# Patient Record
Sex: FEMALE | Race: WHITE | NOT HISPANIC OR LATINO | Employment: OTHER | URBAN - NONMETROPOLITAN AREA
[De-identification: names, ages, dates, MRNs, and addresses within clinical notes are randomized per-mention and may not be internally consistent; named-entity substitution may affect disease eponyms.]

---

## 2017-01-03 ENCOUNTER — OFFICE VISIT (OUTPATIENT)
Dept: FAMILY MEDICINE CLINIC | Facility: CLINIC | Age: 57
End: 2017-01-03

## 2017-01-03 VITALS
HEART RATE: 89 BPM | BODY MASS INDEX: 31.56 KG/M2 | HEIGHT: 62 IN | TEMPERATURE: 99.2 F | DIASTOLIC BLOOD PRESSURE: 71 MMHG | SYSTOLIC BLOOD PRESSURE: 97 MMHG | WEIGHT: 171.5 LBS | OXYGEN SATURATION: 96 %

## 2017-01-03 DIAGNOSIS — H92.01 OTALGIA OF RIGHT EAR: ICD-10-CM

## 2017-01-03 DIAGNOSIS — E78.5 DYSLIPIDEMIA: ICD-10-CM

## 2017-01-03 DIAGNOSIS — M51.36 DEGENERATION OF INTERVERTEBRAL DISC OF LUMBAR REGION: ICD-10-CM

## 2017-01-03 DIAGNOSIS — K21.9 GASTROESOPHAGEAL REFLUX DISEASE WITHOUT ESOPHAGITIS: ICD-10-CM

## 2017-01-03 DIAGNOSIS — J31.0 SEVERE CHRONIC RHINITIS: ICD-10-CM

## 2017-01-03 DIAGNOSIS — J34.2 DEVIATED NASAL SEPTUM: ICD-10-CM

## 2017-01-03 DIAGNOSIS — I10 ESSENTIAL HYPERTENSION: Primary | ICD-10-CM

## 2017-01-03 DIAGNOSIS — J41.0 SIMPLE CHRONIC BRONCHITIS (HCC): ICD-10-CM

## 2017-01-03 PROCEDURE — 99214 OFFICE O/P EST MOD 30 MIN: CPT | Performed by: FAMILY MEDICINE

## 2017-01-03 RX ORDER — OXYCODONE AND ACETAMINOPHEN 10; 325 MG/1; MG/1
1 TABLET ORAL EVERY 8 HOURS PRN
Qty: 90 TABLET | Refills: 0 | Status: SHIPPED | OUTPATIENT
Start: 2017-01-03 | End: 2017-01-31 | Stop reason: SDUPTHER

## 2017-01-03 NOTE — PROGRESS NOTES
Subjective   Zina Hameed is a 56 y.o. female.     Chief Complaint   Patient presents with   • Heartburn   • Med Refill       History of Present Illness   Patient is here for scheduled follow-up visit.  She does continue to have heartburn when she misses dosing of her medication.  Patient also complains of continued right ear discomfort.  Denies any drainage.  No trauma.  She has chronic nasal congestion which has worsened over the last several months as well.  She is continue to use daily saline spray as well as a daily nasal steroid spray.  She denies any fever or chills.  She does have frontal pressure at times worsened with sleeping.  She has a history of nasal fracture several years ago.  She states that her symptoms are worsened since that time.  The following portions of the patient's history were reviewed and updated as appropriate: allergies, current medications, past family history, past medical history, past social history, past surgical history and problem list.    Review of Systems   Constitutional: Negative for activity change, appetite change, chills, diaphoresis, fatigue, fever and unexpected weight change.   HENT: Positive for congestion, ear pain, postnasal drip, rhinorrhea and sinus pressure. Negative for dental problem, drooling, ear discharge, facial swelling, hearing loss, mouth sores, nosebleeds, sneezing, sore throat, tinnitus, trouble swallowing and voice change.    Eyes: Negative for photophobia, pain, discharge, redness, itching and visual disturbance.   Respiratory: Positive for cough. Negative for apnea, choking, chest tightness, shortness of breath, wheezing and stridor.    Cardiovascular: Negative for chest pain, palpitations and leg swelling.   Gastrointestinal: Negative for abdominal distention, abdominal pain, anal bleeding, blood in stool, constipation, diarrhea, nausea, rectal pain and vomiting.   Endocrine: Negative for cold intolerance, heat intolerance, polydipsia,  polyphagia and polyuria.   Genitourinary: Negative for decreased urine volume, difficulty urinating, dysuria, enuresis, flank pain, frequency, genital sores, hematuria and urgency.   Musculoskeletal: Positive for arthralgias, back pain, joint swelling and myalgias. Negative for gait problem, neck pain and neck stiffness.   Skin: Negative for color change, pallor, rash and wound.   Allergic/Immunologic: Negative for food allergies and immunocompromised state.   Neurological: Negative for dizziness, tremors, seizures, syncope, facial asymmetry, speech difficulty, weakness, light-headedness, numbness and headaches.   Hematological: Negative for adenopathy. Does not bruise/bleed easily.   Psychiatric/Behavioral: Negative for agitation, behavioral problems, confusion, decreased concentration, dysphoric mood, hallucinations, self-injury, sleep disturbance and suicidal ideas. The patient is not nervous/anxious and is not hyperactive.        Patient Active Problem List   Diagnosis   • Peripheral vascular disease   • Plantar fasciitis   • Degeneration of intervertebral disc of lumbar region   • Hypertension   • Dyslipidemia   • Chronic obstructive pulmonary disease   • Atopic rhinitis   • Gastroesophageal reflux disease   • Chronic constipation   • Carpal tunnel syndrome   • Nonspecific abnormal results of thyroid function study   • Screening mammogram, encounter for       Current Outpatient Prescriptions on File Prior to Visit   Medication Sig Dispense Refill   • albuterol (PROVENTIL HFA;VENTOLIN HFA) 108 (90 BASE) MCG/ACT inhaler Inhale 2 puffs every 4 (four) hours as needed for wheezing or shortness of air. 1 inhaler 5   • amitriptyline (ELAVIL) 25 MG tablet Take 1 tablet by mouth every night. 30 tablet 5   • aspirin 81 MG EC tablet Take 1 tablet by mouth 4 (four) times a day. 120 tablet 5   • fluticasone (FLONASE) 50 MCG/ACT nasal spray 1 spray into each nostril 2 (two) times a day. 1 each 5   • gabapentin (NEURONTIN)  100 MG capsule Take 1 capsule by mouth 2 (two) times a day. 60 capsule 3   • ipratropium-albuterol (DUO-NEB) 0.5-2.5 mg/mL nebulizer Take 1.5 mL by nebulization every 6 (six) hours as needed for wheezing or shortness of air. 360 mL 5   • mometasone-formoterol (DULERA 200) 200-5 MCG/ACT inhaler Inhale 2 puffs 2 (two) times a day. 13 g 5   • Omega-3 Fatty Acids (FISH OIL) 1000 MG capsule capsule Take 1 capsule by mouth 2 (two) times a day.     • ondansetron (ZOFRAN) 4 MG tablet Take 1 tablet by mouth Every 8 (Eight) Hours As Needed for nausea or vomiting. 20 tablet 1   • pantoprazole (PROTONIX) 40 MG EC tablet Take 1 tablet by mouth daily. 30 tablet 3   • prasugrel (EFFIENT) 10 MG tablet Take 1 tablet by mouth Daily. 30 tablet 5   • propranolol (INDERAL) 20 MG tablet Take 1 tablet by mouth 2 (two) times a day. 60 tablet 5   • ranitidine (ZANTAC) 150 MG tablet Take 1 tablet by mouth 2 (two) times a day. 60 tablet 5   • simvastatin (ZOCOR) 20 MG tablet Take 1 tablet by mouth every night. 30 tablet 5   • spironolactone (ALDACTONE) 50 MG tablet Take 1 tablet by mouth 2 (two) times a day. 60 tablet 5   • tiotropium (SPIRIVA) 18 MCG per inhalation capsule Place 2 puffs into inhaler and inhale daily. 30 capsule 5   • [DISCONTINUED] oxyCODONE-acetaminophen (PERCOCET)  MG per tablet Take 1 tablet by mouth Every 8 (Eight) Hours As Needed for moderate pain (4-6). 90 tablet 0     No current facility-administered medications on file prior to visit.        Social History     Social History   • Marital status:      Spouse name: N/A   • Number of children: N/A   • Years of education: N/A     Occupational History   • Not on file.     Social History Main Topics   • Smoking status: Former Smoker   • Smokeless tobacco: Not on file   • Alcohol use No      Comment: unknown   • Drug use: No   • Sexual activity: Defer     Other Topics Concern   • Not on file     Social History Narrative       Objective   Blood pressure 97/71,  "pulse 89, temperature 99.2 °F (37.3 °C), temperature source Oral, height 62\" (157.5 cm), weight 171 lb 8 oz (77.8 kg), SpO2 96 %.     Physical Exam Constitutional   General appearance: No acute distress, well appearing and well nourished.    Head and Face   Head and face: Normal.    Palpation of the face and sinuses: No sinus tenderness.    Eyes   Conjunctiva and lids: No swelling, erythema or discharge.    Ears, Nose, Mouth, and Throat   External inspection of ears and nose: Normal.    Otoscopic examination: bulging TM esther, dec mobility to valsalva and insufflation esther, worse to R; Hearing: Normal.    Nasal mucosa, septum, and turbinates: Deviated septum with boggy and inflamed nasal turbinates bilaterally.    Lips, teeth, and gums: Normal, good dentition.    Oropharynx: Normal with no erythema, edema, exudate or lesions.    Neck   Neck: Supple, symmetric, trachea midline, no masses.    Pulmonary   Respiratory effort: No increased work of breathing or signs of respiratory distress.    Auscultation of lungs: Clear to auscultation.    Cardiovascular   Auscultation of heart: Normal rate and rhythm, normal S1 and S2, no murmurs.    Pedal pulses: 2+ bilaterally.    Chest Pt deferred.    Chest: Normal.    Abdomen   Abdomen: Non-tender, no masses.    Liver and spleen: No hepatomegaly or splenomegaly.    Genitourinary Pt deferred.    Lymphatic   Palpation of lymph nodes in neck: No lymphadenopathy.    Musculoskeletal   Gait and station: Normal.    Digits and nails: Normal without clubbing or cyanosis.    Joints, bones, and muscles: Normal.    Range of motion: Normal.    Stability: Normal.    Muscle strength/tone: Normal.    Skin   Skin and subcutaneous tissue: Normal without rashes or lesions.    Neurologic   Cranial nerves: Cranial nerves II-XII intact.    Cortical function: Normal mental status.    Reflexes: 2+ and symmetric.    Sensation: No sensory loss.    Coordination: Normal finger to nose and heel to shin. "    Psychiatric   Judgment and insight: Normal.    Orientation to person, place, and time: Normal.    Recent and remote memory: Intact.   Mood and affect: Normal    Results for orders placed or performed in visit on 08/12/16   Comprehensive metabolic panel   Result Value Ref Range    Glucose 81 70 - 100 mg/dL    BUN 11 9 - 23 mg/dL    Creatinine 1.00 0.60 - 1.30 mg/dL    Sodium 141 132 - 146 mmol/L    Potassium 4.4 3.5 - 5.5 mmol/L    Chloride 107 99 - 109 mmol/L    CO2 27.0 20.0 - 31.0 mmol/L    Calcium 9.8 8.7 - 10.4 mg/dL    Total Protein 7.8 5.7 - 8.2 g/dL    Albumin 4.70 3.20 - 4.80 g/dL    ALT (SGPT) 61 (H) 7 - 40 U/L    AST (SGOT) 46 (H) 0 - 33 U/L    Alkaline Phosphatase 68 25 - 100 U/L    Total Bilirubin 0.4 0.3 - 1.2 mg/dL    eGFR Non African Amer 58 (L) >60 mL/min/1.73    Globulin 3.1 gm/dL    A/G Ratio 1.5 g/dL    BUN/Creatinine Ratio 11.0 7.0 - 25.0    Anion Gap 7.0 3.0 - 11.0 mmol/L   NMR LipoProfile   Result Value Ref Range    LDL-P 1161 (H) <1000 nmol/L    LDL Cholesterol  69 0 - 99 mg/dL    HDL-C 39 (L) >39 mg/dL    Triglycerides 237 (H) 0 - 149 mg/dL    Total Cholesterol 155 100 - 199 mg/dL    HDL-P (Total) 32.5 >=30.5 umol/L    Small LDL-P 581 (H) <=527 nmol/L    LDL Size 20.3 >20.5 nm   TSH   Result Value Ref Range    TSH 3.760 0.350 - 5.350 mIU/mL   T4, free   Result Value Ref Range    Free T4 0.86 (L) 0.89 - 1.76 ng/dL   CBC auto differential   Result Value Ref Range    WBC 6.04 3.50 - 10.80 10*3/mm3    RBC 4.19 3.89 - 5.14 10*6/mm3    Hemoglobin 13.8 11.5 - 15.5 g/dL    Hematocrit 42.2 34.5 - 44.0 %    .7 (H) 80.0 - 99.0 fL    MCH 32.9 (H) 27.0 - 31.0 pg    MCHC 32.7 32.0 - 36.0 g/dL    RDW 12.6 11.3 - 14.5 %    RDW-SD 46.5 37.0 - 54.0 fl    MPV 11.8 6.0 - 12.0 fL    Platelets 149 (L) 150 - 450 10*3/mm3    Neutrophil % 56.7 41.0 - 71.0 %    Lymphocyte % 34.3 24.0 - 44.0 %    Monocyte % 7.6 0.0 - 12.0 %    Eosinophil % 1.0 0.0 - 3.0 %    Basophil % 0.2 0.0 - 1.0 %    Immature Grans % 0.2  0.0 - 0.6 %    Neutrophils, Absolute 3.43 1.50 - 8.30 10*3/mm3    Lymphocytes, Absolute 2.07 0.60 - 4.80 10*3/mm3    Monocytes, Absolute 0.46 0.00 - 1.00 10*3/mm3    Eosinophils, Absolute 0.06 (L) 0.10 - 0.30 10*3/mm3    Basophils, Absolute 0.01 0.00 - 0.20 10*3/mm3    Immature Grans, Absolute 0.01 0.00 - 0.03 10*3/mm3       Assessment/Plan   Problems Addressed this Visit        Cardiovascular and Mediastinum    Hypertension - Primary       Respiratory    Chronic obstructive pulmonary disease       Digestive    Gastroesophageal reflux disease       Musculoskeletal and Integument    Degeneration of intervertebral disc of lumbar region    Relevant Medications    oxyCODONE-acetaminophen (PERCOCET)  MG per tablet       Other    Dyslipidemia      Other Visit Diagnoses     BMI 31.0-31.9,adult        Otalgia of right ear        Relevant Orders    Ambulatory Referral to ENT (Otolaryngology)    Deviated nasal septum        Relevant Orders    Ambulatory Referral to ENT (Otolaryngology)    Severe chronic rhinitis        Relevant Orders    Ambulatory Referral to ENT (Otolaryngology)               Discussion/Summary:  Discussed plan of care in detail with pt today; pt verb understanding and agrees; counseled for approx 15 min of total 25 min exam time.    Discussed need for reduction in sodium/salt/caffeine intake; improve sleep habits as able; inc formal CV exercise program with goal of vigorous activity most, if not all, days of the week; goal of 50 min of sustained HR CV exercise.    Continue dietary/lifestyle mods for GERD; cont PPI.    Refer to ENT for eval of additional tx options for chronic right serous OM and otalgia, with severe chronic rhinitis; suspect traumatic deviated septum is contributing, but may benefit from formal allergy testing also.    There are no Patient Instructions on file for this visit.

## 2017-01-03 NOTE — MR AVS SNAPSHOT
Zina Hameed   1/3/2017 10:30 AM   Office Visit    Dept Phone:  [phone]   Encounter #:  [id number]    Provider:  Young Fraire DO   Department:  [facility] PRIMARY CARE                Your Full Care Plan              Where to Get Your Medications      You can get these medications from any pharmacy     Bring a paper prescription for each of these medications     oxyCODONE-acetaminophen  MG per tablet            Your Updated Medication List          This list is accurate as of: 1/3/17 11:06 AM.  Always use your most recent med list.                albuterol 108 (90 BASE) MCG/ACT inhaler   Commonly known as:  PROVENTIL HFA;VENTOLIN HFA   Inhale 2 puffs every 4 (four) hours as needed for wheezing or shortness of air.       amitriptyline 25 MG tablet   Commonly known as:  ELAVIL   Take 1 tablet by mouth every night.       aspirin 81 MG EC tablet   Take 1 tablet by mouth 4 (four) times a day.       fish oil 1000 MG capsule capsule   Take 1 capsule by mouth 2 (two) times a day.       fluticasone 50 MCG/ACT nasal spray   Commonly known as:  FLONASE   1 spray into each nostril 2 (two) times a day.       gabapentin 100 MG capsule   Commonly known as:  NEURONTIN   Take 1 capsule by mouth 2 (two) times a day.       ipratropium-albuterol 0.5-2.5 mg/mL nebulizer   Commonly known as:  DUO-NEB   Take 1.5 mL by nebulization every 6 (six) hours as needed for wheezing or shortness of air.       mometasone-formoterol 200-5 MCG/ACT inhaler   Commonly known as:  DULERA 200   Inhale 2 puffs 2 (two) times a day.       ondansetron 4 MG tablet   Commonly known as:  ZOFRAN   Take 1 tablet by mouth Every 8 (Eight) Hours As Needed for nausea or vomiting.       oxyCODONE-acetaminophen  MG per tablet   Commonly known as:  PERCOCET   Take 1 tablet by mouth Every 8 (Eight) Hours As Needed for moderate pain (4-6).       pantoprazole 40 MG EC tablet   Commonly known as:  PROTONIX      Take 1 tablet by mouth daily.       prasugrel 10 MG tablet   Commonly known as:  EFFIENT   Take 1 tablet by mouth Daily.       propranolol 20 MG tablet   Commonly known as:  INDERAL   Take 1 tablet by mouth 2 (two) times a day.       raNITIdine 150 MG tablet   Commonly known as:  ZANTAC   Take 1 tablet by mouth 2 (two) times a day.       simvastatin 20 MG tablet   Commonly known as:  ZOCOR   Take 1 tablet by mouth every night.       spironolactone 50 MG tablet   Commonly known as:  ALDACTONE   Take 1 tablet by mouth 2 (two) times a day.       tiotropium 18 MCG per inhalation capsule   Commonly known as:  SPIRIVA   Place 2 puffs into inhaler and inhale daily.               We Performed the Following     Ambulatory Referral to ENT (Otolaryngology)       You Were Diagnosed With        Codes Comments    Essential hypertension    -  Primary ICD-10-CM: I10  ICD-9-CM: 401.9     Degeneration of intervertebral disc of lumbar region     ICD-10-CM: M51.36  ICD-9-CM: 722.52     Gastroesophageal reflux disease without esophagitis     ICD-10-CM: K21.9  ICD-9-CM: 530.81     Simple chronic bronchitis     ICD-10-CM: J41.0  ICD-9-CM: 491.0     Dyslipidemia     ICD-10-CM: E78.5  ICD-9-CM: 272.4     BMI 31.0-31.9,adult     ICD-10-CM: Z68.31  ICD-9-CM: V85.31     Otalgia of right ear     ICD-10-CM: H92.01  ICD-9-CM: 388.70     Deviated nasal septum     ICD-10-CM: J34.2  ICD-9-CM: 470     Severe chronic rhinitis     ICD-10-CM: J31.0  ICD-9-CM: 472.0       Instructions     None    Patient Instructions History      Upcoming Appointments     Visit Type Date Time Department    OFFICE VISIT 1/3/2017 10:30 AM [facility]    OFFICE VISIT 1/31/2017 11:15 AM [facility]      [facility] Signup     [facility] [employer] allows you to send messages to your doctor, view your test results, renew your prescriptions, schedule appointments, and more. To sign up, go to [employer] and click on the Sign Up Now link in the New  "User? box. Enter your [employer] Activation Code exactly as it appears below along with the last four digits of your Social Security Number and your Date of Birth () to complete the sign-up process. If you do not sign up before the expiration date, you must request a new code.    [employer] Activation Code: [email or web address]  Expires: 2017  5:36 AM    If you have questions, you can email [email or web address] or call [phone] to talk to our [employer] staff. Remember, [employer] is NOT to be used for urgent needs. For medical emergencies, dial 911.               Other Info from Your Visit           Your Appointments     2017 11:15 AM EST   Office Visit with Young Fraire DO   [facility] PRIMARY CARE (--)    [address] [place] [id number] [phone]           Arrive 15 minutes prior to appointment.              Allergies     No Known Allergies      Reason for Visit     Heartburn     Med Refill           Vital Signs     Blood Pressure Pulse Temperature Height Weight Oxygen Saturation    97/71 89 99.2 °F (37.3 °C) (Oral) 62\" (157.5 cm) 171 lb 8 oz (77.8 kg) 96%    Body Mass Index Smoking Status                31.37 kg/m2 Former Smoker          Problems and Diagnoses Noted     Chronic airway obstruction    Degeneration of lumbar or lumbosacral intervertebral disc    Dyslipidemia    Acid reflux disease    High blood pressure    Body mass index 31.0-31.9, adult        Ear pain        Deviated septum        [place]            "

## 2017-01-07 DIAGNOSIS — M51.36 DEGENERATION OF INTERVERTEBRAL DISC OF LUMBAR REGION: ICD-10-CM

## 2017-01-09 RX ORDER — GABAPENTIN 100 MG/1
CAPSULE ORAL
Qty: 60 CAPSULE | Refills: 3 | Status: SHIPPED | OUTPATIENT
Start: 2017-01-09 | End: 2017-04-28 | Stop reason: SDUPTHER

## 2017-01-31 ENCOUNTER — OFFICE VISIT (OUTPATIENT)
Dept: FAMILY MEDICINE CLINIC | Facility: CLINIC | Age: 57
End: 2017-01-31

## 2017-01-31 VITALS
HEIGHT: 62 IN | HEART RATE: 100 BPM | DIASTOLIC BLOOD PRESSURE: 83 MMHG | SYSTOLIC BLOOD PRESSURE: 146 MMHG | BODY MASS INDEX: 30.36 KG/M2 | WEIGHT: 165 LBS | OXYGEN SATURATION: 100 % | TEMPERATURE: 98.7 F

## 2017-01-31 DIAGNOSIS — M51.36 DEGENERATION OF INTERVERTEBRAL DISC OF LUMBAR REGION: ICD-10-CM

## 2017-01-31 DIAGNOSIS — K21.9 GASTROESOPHAGEAL REFLUX DISEASE WITHOUT ESOPHAGITIS: ICD-10-CM

## 2017-01-31 DIAGNOSIS — J41.0 SIMPLE CHRONIC BRONCHITIS (HCC): ICD-10-CM

## 2017-01-31 DIAGNOSIS — I10 ESSENTIAL HYPERTENSION: Primary | ICD-10-CM

## 2017-01-31 PROCEDURE — 99214 OFFICE O/P EST MOD 30 MIN: CPT | Performed by: FAMILY MEDICINE

## 2017-01-31 RX ORDER — OXYCODONE AND ACETAMINOPHEN 10; 325 MG/1; MG/1
1 TABLET ORAL EVERY 8 HOURS PRN
Qty: 90 TABLET | Refills: 0 | Status: SHIPPED | OUTPATIENT
Start: 2017-01-31 | End: 2017-02-28 | Stop reason: SDUPTHER

## 2017-01-31 NOTE — MR AVS SNAPSHOT
Zina Lewisbarbflorinda   1/31/2017 11:15 AM   Office Visit    Dept Phone:  [phone]   Encounter #:  [id number]    Provider:  Young Fraire DO   Department:  [facility] PRIMARY CARE                Your Full Care Plan              Where to Get Your Medications      You can get these medications from any pharmacy     Bring a paper prescription for each of these medications     oxyCODONE-acetaminophen  MG per tablet            Your Updated Medication List          This list is accurate as of: 1/31/17 11:49 AM.  Always use your most recent med list.                albuterol 108 (90 BASE) MCG/ACT inhaler   Commonly known as:  PROVENTIL HFA;VENTOLIN HFA   Inhale 2 puffs every 4 (four) hours as needed for wheezing or shortness of air.       amitriptyline 25 MG tablet   Commonly known as:  ELAVIL   Take 1 tablet by mouth every night.       aspirin 81 MG EC tablet   Take 1 tablet by mouth 4 (four) times a day.       fish oil 1000 MG capsule capsule   Take 1 capsule by mouth 2 (two) times a day.       fluticasone 50 MCG/ACT nasal spray   Commonly known as:  FLONASE   1 spray into each nostril 2 (two) times a day.       gabapentin 100 MG capsule   Commonly known as:  NEURONTIN   take 1 capsule by mouth twice a day       ipratropium-albuterol 0.5-2.5 mg/mL nebulizer   Commonly known as:  DUO-NEB   Take 1.5 mL by nebulization every 6 (six) hours as needed for wheezing or shortness of air.       mometasone-formoterol 200-5 MCG/ACT inhaler   Commonly known as:  DULERA 200   Inhale 2 puffs 2 (two) times a day.       ondansetron 4 MG tablet   Commonly known as:  ZOFRAN   Take 1 tablet by mouth Every 8 (Eight) Hours As Needed for nausea or vomiting.       oxyCODONE-acetaminophen  MG per tablet   Commonly known as:  PERCOCET   Take 1 tablet by mouth Every 8 (Eight) Hours As Needed for moderate pain (4-6).       pantoprazole 40 MG EC tablet   Commonly known as:  PROTONIX   Take 1  tablet by mouth daily.       prasugrel 10 MG tablet   Commonly known as:  EFFIENT   Take 1 tablet by mouth Daily.       propranolol 20 MG tablet   Commonly known as:  INDERAL   Take 1 tablet by mouth 2 (two) times a day.       raNITIdine 150 MG tablet   Commonly known as:  ZANTAC   Take 1 tablet by mouth 2 (two) times a day.       simvastatin 20 MG tablet   Commonly known as:  ZOCOR   Take 1 tablet by mouth every night.       spironolactone 50 MG tablet   Commonly known as:  ALDACTONE   Take 1 tablet by mouth 2 (two) times a day.       tiotropium 18 MCG per inhalation capsule   Commonly known as:  SPIRIVA   Place 2 puffs into inhaler and inhale daily.               You Were Diagnosed With        Codes Comments    Essential hypertension    -  Primary ICD-10-CM: I10  ICD-9-CM: 401.9     Degeneration of intervertebral disc of lumbar region     ICD-10-CM: M51.36  ICD-9-CM: 722.52     BMI 30.0-30.9,adult     ICD-10-CM: Z68.30  ICD-9-CM: V85.30     Simple chronic bronchitis     ICD-10-CM: J41.0  ICD-9-CM: 491.0     Gastroesophageal reflux disease without esophagitis     ICD-10-CM: K21.9  ICD-9-CM: 530.81       Instructions     None    Patient Instructions History      Upcoming Appointments     Visit Type Date Time Department    OFFICE VISIT 2017 11:15 AM [facility]    OFFICE VISIT 2017 11:15 AM [facility]      [employer] Signup     [facility] [employer] allows you to send messages to your doctor, view your test results, renew your prescriptions, schedule appointments, and more. To sign up, go to [employer] and click on the Sign Up Now link in the New User? box. Enter your [employer] Activation Code exactly as it appears below along with the last four digits of your Social Security Number and your Date of Birth () to complete the sign-up process. If you do not sign up before the expiration date, you must request a new code.    [employer] Activation Code: [email or web address]  Expires:  "2/4/2017  5:36 AM    If you have questions, you can email [email or web address] or call [phone] to talk to our MyChart staff. Remember, [employer] is NOT to be used for urgent needs. For medical emergencies, dial 911.               Other Info from Your Visit           Your Appointments     Feb 28, 2017 11:15 AM EST   Office Visit with Young Fraire DO   [facility] PRIMARY CARE (--)    [address] [place] [id number] [phone]           Arrive 15 minutes prior to appointment.              Allergies     No Known Allergies      Reason for Visit     Follow-up med refills      Vital Signs     Blood Pressure Pulse Temperature Height Weight Oxygen Saturation    146/83 (BP Location: Right arm, Patient Position: Sitting) 100 98.7 °F (37.1 °C) 62\" (157.5 cm) 165 lb (74.8 kg) 100%    Body Mass Index Smoking Status                30.18 kg/m2 Former Smoker          Problems and Diagnoses Noted     Chronic airway obstruction    Degeneration of lumbar or lumbosacral intervertebral disc    Acid reflux disease    High blood pressure    Body mass index 30.0-30.9, adult            "

## 2017-01-31 NOTE — PROGRESS NOTES
Subjective   Zina Hameed is a 56 y.o. female.     Chief Complaint   Patient presents with   • Follow-up     med refills       History of Present Illness   Pt here for sched f/u appt; has made sig changes to diet/exercise program   The following portions of the patient's history were reviewed and updated as appropriate: allergies, current medications, past family history, past medical history, past social history, past surgical history and problem list.    Review of Systems   Constitutional: Negative for activity change, appetite change, chills, diaphoresis, fatigue, fever and unexpected weight change.   HENT: Positive for congestion and postnasal drip. Negative for dental problem, drooling, ear discharge, ear pain, facial swelling, hearing loss, mouth sores, nosebleeds, rhinorrhea, sinus pressure, sneezing, sore throat, tinnitus, trouble swallowing and voice change.    Eyes: Negative for photophobia, pain, discharge, redness, itching and visual disturbance.   Respiratory: Positive for cough. Negative for apnea, choking, chest tightness, shortness of breath, wheezing and stridor.    Cardiovascular: Negative for chest pain, palpitations and leg swelling.   Gastrointestinal: Negative for abdominal distention, abdominal pain, anal bleeding, blood in stool, constipation, diarrhea, nausea, rectal pain and vomiting.   Endocrine: Negative for cold intolerance, heat intolerance, polydipsia, polyphagia and polyuria.   Genitourinary: Negative for decreased urine volume, difficulty urinating, dysuria, enuresis, flank pain, frequency, genital sores, hematuria and urgency.   Musculoskeletal: Positive for arthralgias, back pain, joint swelling and myalgias. Negative for gait problem, neck pain and neck stiffness.   Skin: Negative for color change, pallor, rash and wound.   Allergic/Immunologic: Negative for food allergies and immunocompromised state.   Neurological: Negative for dizziness, tremors, seizures, syncope, facial  asymmetry, speech difficulty, weakness, light-headedness, numbness and headaches.   Hematological: Negative for adenopathy. Does not bruise/bleed easily.   Psychiatric/Behavioral: Negative for agitation, behavioral problems, confusion, decreased concentration, dysphoric mood, hallucinations, self-injury, sleep disturbance and suicidal ideas. The patient is not nervous/anxious and is not hyperactive.        Patient Active Problem List   Diagnosis   • Peripheral vascular disease   • Plantar fasciitis   • Degeneration of intervertebral disc of lumbar region   • Hypertension   • Dyslipidemia   • Chronic obstructive pulmonary disease   • Atopic rhinitis   • Gastroesophageal reflux disease   • Chronic constipation   • Carpal tunnel syndrome   • Nonspecific abnormal results of thyroid function study   • Screening mammogram, encounter for       Current Outpatient Prescriptions on File Prior to Visit   Medication Sig Dispense Refill   • albuterol (PROVENTIL HFA;VENTOLIN HFA) 108 (90 BASE) MCG/ACT inhaler Inhale 2 puffs every 4 (four) hours as needed for wheezing or shortness of air. 1 inhaler 5   • amitriptyline (ELAVIL) 25 MG tablet Take 1 tablet by mouth every night. 30 tablet 5   • aspirin 81 MG EC tablet Take 1 tablet by mouth 4 (four) times a day. 120 tablet 5   • fluticasone (FLONASE) 50 MCG/ACT nasal spray 1 spray into each nostril 2 (two) times a day. 1 each 5   • gabapentin (NEURONTIN) 100 MG capsule take 1 capsule by mouth twice a day 60 capsule 3   • ipratropium-albuterol (DUO-NEB) 0.5-2.5 mg/mL nebulizer Take 1.5 mL by nebulization every 6 (six) hours as needed for wheezing or shortness of air. 360 mL 5   • mometasone-formoterol (DULERA 200) 200-5 MCG/ACT inhaler Inhale 2 puffs 2 (two) times a day. 13 g 5   • Omega-3 Fatty Acids (FISH OIL) 1000 MG capsule capsule Take 1 capsule by mouth 2 (two) times a day.     • ondansetron (ZOFRAN) 4 MG tablet Take 1 tablet by mouth Every 8 (Eight) Hours As Needed for nausea or  "vomiting. 20 tablet 1   • oxyCODONE-acetaminophen (PERCOCET)  MG per tablet Take 1 tablet by mouth Every 8 (Eight) Hours As Needed for moderate pain (4-6). 90 tablet 0   • pantoprazole (PROTONIX) 40 MG EC tablet Take 1 tablet by mouth daily. 30 tablet 3   • prasugrel (EFFIENT) 10 MG tablet Take 1 tablet by mouth Daily. 30 tablet 5   • propranolol (INDERAL) 20 MG tablet Take 1 tablet by mouth 2 (two) times a day. 60 tablet 5   • ranitidine (ZANTAC) 150 MG tablet Take 1 tablet by mouth 2 (two) times a day. 60 tablet 5   • simvastatin (ZOCOR) 20 MG tablet Take 1 tablet by mouth every night. 30 tablet 5   • spironolactone (ALDACTONE) 50 MG tablet Take 1 tablet by mouth 2 (two) times a day. 60 tablet 5   • tiotropium (SPIRIVA) 18 MCG per inhalation capsule Place 2 puffs into inhaler and inhale daily. 30 capsule 5     No current facility-administered medications on file prior to visit.        Social History     Social History   • Marital status:      Spouse name: N/A   • Number of children: N/A   • Years of education: N/A     Occupational History   • Not on file.     Social History Main Topics   • Smoking status: Former Smoker   • Smokeless tobacco: Not on file   • Alcohol use No      Comment: unknown   • Drug use: No   • Sexual activity: Defer     Other Topics Concern   • Not on file     Social History Narrative       Objective   Blood pressure 146/83, pulse 100, temperature 98.7 °F (37.1 °C), height 62\" (157.5 cm), weight 165 lb (74.8 kg), SpO2 100 %.     Physical Exam Constitutional   General appearance: No acute distress, well appearing and well nourished.    Head and Face   Head and face: Normal.    Palpation of the face and sinuses: No sinus tenderness.    Eyes   Conjunctiva and lids: No swelling, erythema or discharge.    Ears, Nose, Mouth, and Throat   External inspection of ears and nose: Normal.    Otoscopic examination: bulging TM esther, dec mobility to valsalva and insufflation esther, worse to R; " Hearing: Normal.    Nasal mucosa, septum, and turbinates: Deviated septum with boggy and inflamed nasal turbinates bilaterally.    Lips, teeth, and gums: Normal, good dentition.    Oropharynx: Normal with no erythema, edema, exudate or lesions.    Neck   Neck: Supple, symmetric, trachea midline, no masses.    Pulmonary   Respiratory effort: No increased work of breathing or signs of respiratory distress.    Auscultation of lungs: Clear to auscultation.    Cardiovascular   Auscultation of heart: Normal rate and rhythm, normal S1 and S2, no murmurs.    Pedal pulses: 2+ bilaterally.    Chest Pt deferred.    Chest: Normal.    Abdomen   Abdomen: Non-tender, no masses.    Liver and spleen: No hepatomegaly or splenomegaly.    Genitourinary Pt deferred.    Lymphatic   Palpation of lymph nodes in neck: No lymphadenopathy.    Musculoskeletal   Gait and station: Normal.    Digits and nails: Normal without clubbing or cyanosis.    Joints, bones, and muscles: Normal.    Range of motion: Normal.    Stability: Normal.    Muscle strength/tone: Normal.    Skin   Skin and subcutaneous tissue: Normal without rashes or lesions.    Neurologic   Cranial nerves: Cranial nerves II-XII intact.    Cortical function: Normal mental status.    Reflexes: 2+ and symmetric.    Sensation: No sensory loss.    Coordination: Normal finger to nose and heel to shin.    Psychiatric   Judgment and insight: Normal.    Orientation to person, place, and time: Normal.    Recent and remote memory: Intact.   Mood and affect: Normal    Results for orders placed or performed in visit on 08/12/16   Comprehensive metabolic panel   Result Value Ref Range    Glucose 81 70 - 100 mg/dL    BUN 11 9 - 23 mg/dL    Creatinine 1.00 0.60 - 1.30 mg/dL    Sodium 141 132 - 146 mmol/L    Potassium 4.4 3.5 - 5.5 mmol/L    Chloride 107 99 - 109 mmol/L    CO2 27.0 20.0 - 31.0 mmol/L    Calcium 9.8 8.7 - 10.4 mg/dL    Total Protein 7.8 5.7 - 8.2 g/dL    Albumin 4.70 3.20 - 4.80 g/dL     ALT (SGPT) 61 (H) 7 - 40 U/L    AST (SGOT) 46 (H) 0 - 33 U/L    Alkaline Phosphatase 68 25 - 100 U/L    Total Bilirubin 0.4 0.3 - 1.2 mg/dL    eGFR Non African Amer 58 (L) >60 mL/min/1.73    Globulin 3.1 gm/dL    A/G Ratio 1.5 g/dL    BUN/Creatinine Ratio 11.0 7.0 - 25.0    Anion Gap 7.0 3.0 - 11.0 mmol/L   NMR LipoProfile   Result Value Ref Range    LDL-P 1161 (H) <1000 nmol/L    LDL Cholesterol  69 0 - 99 mg/dL    HDL-C 39 (L) >39 mg/dL    Triglycerides 237 (H) 0 - 149 mg/dL    Total Cholesterol 155 100 - 199 mg/dL    HDL-P (Total) 32.5 >=30.5 umol/L    Small LDL-P 581 (H) <=527 nmol/L    LDL Size 20.3 >20.5 nm   TSH   Result Value Ref Range    TSH 3.760 0.350 - 5.350 mIU/mL   T4, free   Result Value Ref Range    Free T4 0.86 (L) 0.89 - 1.76 ng/dL   CBC auto differential   Result Value Ref Range    WBC 6.04 3.50 - 10.80 10*3/mm3    RBC 4.19 3.89 - 5.14 10*6/mm3    Hemoglobin 13.8 11.5 - 15.5 g/dL    Hematocrit 42.2 34.5 - 44.0 %    .7 (H) 80.0 - 99.0 fL    MCH 32.9 (H) 27.0 - 31.0 pg    MCHC 32.7 32.0 - 36.0 g/dL    RDW 12.6 11.3 - 14.5 %    RDW-SD 46.5 37.0 - 54.0 fl    MPV 11.8 6.0 - 12.0 fL    Platelets 149 (L) 150 - 450 10*3/mm3    Neutrophil % 56.7 41.0 - 71.0 %    Lymphocyte % 34.3 24.0 - 44.0 %    Monocyte % 7.6 0.0 - 12.0 %    Eosinophil % 1.0 0.0 - 3.0 %    Basophil % 0.2 0.0 - 1.0 %    Immature Grans % 0.2 0.0 - 0.6 %    Neutrophils, Absolute 3.43 1.50 - 8.30 10*3/mm3    Lymphocytes, Absolute 2.07 0.60 - 4.80 10*3/mm3    Monocytes, Absolute 0.46 0.00 - 1.00 10*3/mm3    Eosinophils, Absolute 0.06 (L) 0.10 - 0.30 10*3/mm3    Basophils, Absolute 0.01 0.00 - 0.20 10*3/mm3    Immature Grans, Absolute 0.01 0.00 - 0.03 10*3/mm3       Assessment/Plan   Problems Addressed this Visit        Cardiovascular and Mediastinum    Hypertension - Primary       Musculoskeletal and Integument    Degeneration of intervertebral disc of lumbar region      Other Visit Diagnoses     BMI 30.0-30.9,adult                    Discussion/Summary:  Discussed plan of care in detail with pt today; pt verb understanding and agrees; counseled for approx 15 min of total 25 min exam time.    Anti - reflux measures, trigger foods and drinks to avoid: Fatty foods, alcohol, chocolate, coffee, tea, caffeinated soft drinks (decaffeinated coffee still has some caffeine), peppermint and spearmint, spices and vinegar, citrus fruits and juices, tomatoes and tomato sauces, and smoking. Other antireflux measures include weight reduction if overweight, avoid tight clothing around the abdomen, elevate the head of her bed 6 inches (May use a bed wedge which is placed between the mattress in box [place]) or blocks under the head of the bed, don't drink or eat for 2 hours before going to bed and avoid lying down immediately after meals.    Discussed need for reduction in sodium/salt/caffeine intake; improve sleep habits as able; inc formal CV exercise program with goal of vigorous activity most, if not all, days of the week; goal of 50 min of sustained HR CV exercise as able.    Very pleased with 6 pound wt loss since last visit; pt has employed considerable dietary changes, as well as inc activity level.    There are no Patient Instructions on file for this visit.

## 2017-02-28 ENCOUNTER — OFFICE VISIT (OUTPATIENT)
Dept: FAMILY MEDICINE CLINIC | Facility: CLINIC | Age: 57
End: 2017-02-28

## 2017-02-28 VITALS
DIASTOLIC BLOOD PRESSURE: 73 MMHG | HEART RATE: 91 BPM | OXYGEN SATURATION: 97 % | TEMPERATURE: 98.9 F | BODY MASS INDEX: 30.55 KG/M2 | HEIGHT: 62 IN | SYSTOLIC BLOOD PRESSURE: 121 MMHG | WEIGHT: 166 LBS

## 2017-02-28 DIAGNOSIS — K21.9 GASTROESOPHAGEAL REFLUX DISEASE WITHOUT ESOPHAGITIS: ICD-10-CM

## 2017-02-28 DIAGNOSIS — E78.5 DYSLIPIDEMIA: ICD-10-CM

## 2017-02-28 DIAGNOSIS — J41.0 SIMPLE CHRONIC BRONCHITIS (HCC): ICD-10-CM

## 2017-02-28 DIAGNOSIS — I10 ESSENTIAL HYPERTENSION: Primary | ICD-10-CM

## 2017-02-28 DIAGNOSIS — M51.36 DEGENERATION OF INTERVERTEBRAL DISC OF LUMBAR REGION: ICD-10-CM

## 2017-02-28 DIAGNOSIS — R94.6 ABNORMAL THYROID FUNCTION TEST: ICD-10-CM

## 2017-02-28 PROCEDURE — 99214 OFFICE O/P EST MOD 30 MIN: CPT | Performed by: FAMILY MEDICINE

## 2017-02-28 RX ORDER — OXYCODONE AND ACETAMINOPHEN 10; 325 MG/1; MG/1
1 TABLET ORAL EVERY 8 HOURS PRN
Qty: 90 TABLET | Refills: 0 | Status: SHIPPED | OUTPATIENT
Start: 2017-02-28 | End: 2017-03-28 | Stop reason: SDUPTHER

## 2017-03-02 LAB
ALBUMIN SERPL-MCNC: 5 G/DL (ref 3.5–5)
ALBUMIN/GLOB SERPL: 1.3 G/DL (ref 1–2)
ALP SERPL-CCNC: 81 U/L (ref 38–126)
ALT SERPL-CCNC: 71 U/L (ref 13–69)
AST SERPL-CCNC: 79 U/L (ref 15–46)
BILIRUB SERPL-MCNC: 0.8 MG/DL (ref 0.2–1.3)
BUN SERPL-MCNC: 14 MG/DL (ref 7–20)
BUN/CREAT SERPL: 12.7 (ref 7.1–23.5)
CALCIUM SERPL-MCNC: 10.3 MG/DL (ref 8.4–10.2)
CHLORIDE SERPL-SCNC: 106 MMOL/L (ref 98–107)
CHOLEST SERPL-MCNC: 150 MG/DL (ref 100–199)
CO2 SERPL-SCNC: 25 MMOL/L (ref 26–30)
CREAT SERPL-MCNC: 1.1 MG/DL (ref 0.6–1.3)
GLOBULIN SER CALC-MCNC: 3.9 GM/DL
GLUCOSE SERPL-MCNC: 102 MG/DL (ref 74–98)
HDL SERPL-SCNC: 27 UMOL/L
HDLC SERPL-MCNC: 36 MG/DL
LDL SERPL QN: 20.3 NM
LDL SERPL-SCNC: 1452 NMOL/L
LDL SMALL SERPL-SCNC: 732 NMOL/L
LDLC SERPL CALC-MCNC: 75 MG/DL (ref 0–99)
POTASSIUM SERPL-SCNC: 4.8 MMOL/L (ref 3.5–5.1)
PROT SERPL-MCNC: 8.9 G/DL (ref 6.3–8.2)
SODIUM SERPL-SCNC: 143 MMOL/L (ref 137–145)
T4 FREE SERPL-MCNC: 0.9 NG/DL (ref 0.78–2.19)
TRIGL SERPL-MCNC: 195 MG/DL (ref 0–149)
TSH SERPL DL<=0.005 MIU/L-ACNC: 3.67 MIU/ML (ref 0.47–4.68)

## 2017-03-04 DIAGNOSIS — I10 ESSENTIAL HYPERTENSION: ICD-10-CM

## 2017-03-06 RX ORDER — PROPRANOLOL HYDROCHLORIDE 20 MG/1
TABLET ORAL
Qty: 60 TABLET | Refills: 5 | Status: SHIPPED | OUTPATIENT
Start: 2017-03-06 | End: 2017-10-27 | Stop reason: SDUPTHER

## 2017-03-28 ENCOUNTER — OFFICE VISIT (OUTPATIENT)
Dept: FAMILY MEDICINE CLINIC | Facility: CLINIC | Age: 57
End: 2017-03-28

## 2017-03-28 VITALS
OXYGEN SATURATION: 97 % | HEIGHT: 62 IN | HEART RATE: 97 BPM | WEIGHT: 166 LBS | BODY MASS INDEX: 30.55 KG/M2 | DIASTOLIC BLOOD PRESSURE: 74 MMHG | TEMPERATURE: 98.5 F | SYSTOLIC BLOOD PRESSURE: 150 MMHG

## 2017-03-28 DIAGNOSIS — I73.9 PERIPHERAL VASCULAR DISEASE (HCC): ICD-10-CM

## 2017-03-28 DIAGNOSIS — Z12.31 SCREENING MAMMOGRAM, ENCOUNTER FOR: ICD-10-CM

## 2017-03-28 DIAGNOSIS — J42 CHRONIC BRONCHITIS, UNSPECIFIED CHRONIC BRONCHITIS TYPE (HCC): ICD-10-CM

## 2017-03-28 DIAGNOSIS — R74.8 ELEVATED LIVER ENZYMES: ICD-10-CM

## 2017-03-28 DIAGNOSIS — Z11.59 SCREENING FOR VIRAL DISEASE: ICD-10-CM

## 2017-03-28 DIAGNOSIS — Z78.0 POSTMENOPAUSAL: ICD-10-CM

## 2017-03-28 DIAGNOSIS — Z01.419 ENCOUNTER FOR GYNECOLOGICAL EXAMINATION WITHOUT ABNORMAL FINDING: ICD-10-CM

## 2017-03-28 DIAGNOSIS — M51.36 DEGENERATION OF INTERVERTEBRAL DISC OF LUMBAR REGION: ICD-10-CM

## 2017-03-28 DIAGNOSIS — I10 ESSENTIAL HYPERTENSION: ICD-10-CM

## 2017-03-28 DIAGNOSIS — E78.5 DYSLIPIDEMIA: ICD-10-CM

## 2017-03-28 DIAGNOSIS — Z23 NEED FOR TDAP VACCINATION: ICD-10-CM

## 2017-03-28 DIAGNOSIS — K21.9 GASTROESOPHAGEAL REFLUX DISEASE WITHOUT ESOPHAGITIS: Primary | ICD-10-CM

## 2017-03-28 PROCEDURE — 99214 OFFICE O/P EST MOD 30 MIN: CPT | Performed by: FAMILY MEDICINE

## 2017-03-28 RX ORDER — OXYCODONE AND ACETAMINOPHEN 10; 325 MG/1; MG/1
1 TABLET ORAL EVERY 8 HOURS PRN
Qty: 90 TABLET | Refills: 0 | Status: SHIPPED | OUTPATIENT
Start: 2017-03-28 | End: 2017-04-28 | Stop reason: SDUPTHER

## 2017-03-29 LAB
HAV AB SER QL IA: POSITIVE
HBV CORE AB SERPL QL IA: POSITIVE
HBV SURFACE AB SER QL: NON REACTIVE
HBV SURFACE AG SERPL QL IA: NEGATIVE
HCV AB S/CO SERPL IA: <0.1 S/CO RATIO (ref 0–0.9)

## 2017-04-28 ENCOUNTER — RESULTS ENCOUNTER (OUTPATIENT)
Dept: FAMILY MEDICINE CLINIC | Facility: CLINIC | Age: 57
End: 2017-04-28

## 2017-04-28 ENCOUNTER — OFFICE VISIT (OUTPATIENT)
Dept: FAMILY MEDICINE CLINIC | Facility: CLINIC | Age: 57
End: 2017-04-28

## 2017-04-28 VITALS
BODY MASS INDEX: 30.36 KG/M2 | OXYGEN SATURATION: 99 % | SYSTOLIC BLOOD PRESSURE: 143 MMHG | HEART RATE: 89 BPM | HEIGHT: 62 IN | TEMPERATURE: 98.3 F | DIASTOLIC BLOOD PRESSURE: 75 MMHG | RESPIRATION RATE: 16 BRPM | WEIGHT: 165 LBS

## 2017-04-28 DIAGNOSIS — M51.36 DEGENERATION OF INTERVERTEBRAL DISC OF LUMBAR REGION: ICD-10-CM

## 2017-04-28 DIAGNOSIS — K21.9 GASTROESOPHAGEAL REFLUX DISEASE WITHOUT ESOPHAGITIS: Primary | ICD-10-CM

## 2017-04-28 DIAGNOSIS — J41.0 SIMPLE CHRONIC BRONCHITIS (HCC): ICD-10-CM

## 2017-04-28 PROCEDURE — 99214 OFFICE O/P EST MOD 30 MIN: CPT | Performed by: INTERNAL MEDICINE

## 2017-04-28 RX ORDER — OXYCODONE AND ACETAMINOPHEN 10; 325 MG/1; MG/1
1 TABLET ORAL EVERY 8 HOURS PRN
Qty: 90 TABLET | Refills: 0 | Status: SHIPPED | OUTPATIENT
Start: 2017-04-28 | End: 2017-06-14 | Stop reason: SDUPTHER

## 2017-04-28 RX ORDER — GABAPENTIN 100 MG/1
100 CAPSULE ORAL 2 TIMES DAILY PRN
Qty: 60 CAPSULE | Refills: 3 | Status: SHIPPED | OUTPATIENT
Start: 2017-04-28 | End: 2017-09-19 | Stop reason: SDUPTHER

## 2017-04-28 NOTE — PROGRESS NOTES
Chief Complaint   Patient presents with   • Establish Care     Patient is here to establish care today and get her medication refills. PHQ-9 completed.        Subjective     History of Present Illness   Zina Hameed is a 57 y.o. female presenting for follow up on medical problems, transfer from Dr. Fraire.    Patient suffers from chronic back pain after she fell in 2007 and injured back.  She had a known Stage IV scoliosis which was since then exacerbated.  She has been on chronic pain medications since that time. She has tried numerous methods for pain control including bracing, PT, and tens unit without benefit.  She continues to suffer from continued back spasms and pain radiating to her lower extremities.     GERD with PUD, has been more nauseus recently.    PAD in % blockage, LLE stent placed, recalled and replaced.  Illiac stents placed 2011 by Dr. Charles.  She continues to suffer from PAD with claudication symptoms despite treatments.        Lortab caused nausea and vomiting.    The following portions of the patient's history were reviewed and updated as appropriate: allergies, current medications, past family history, past medical history, past social history, past surgical history and problem list.    Review of Systems   Constitutional: Negative for chills, fatigue and fever.   HENT: Negative for congestion, ear pain, rhinorrhea, sinus pressure and sore throat.    Eyes: Negative for visual disturbance.   Respiratory: Negative for cough, chest tightness, shortness of breath and wheezing.    Cardiovascular: Negative for chest pain, palpitations and leg swelling.   Gastrointestinal: Negative for abdominal pain, blood in stool, constipation, diarrhea, nausea and vomiting.   Endocrine: Negative for polydipsia and polyuria.   Genitourinary: Negative for dysuria and hematuria.   Musculoskeletal: Negative for back pain.   Skin: Negative for rash.   Neurological: Negative for dizziness, light-headedness,  numbness and headaches.   Psychiatric/Behavioral: Negative for dysphoric mood and sleep disturbance. The patient is not nervous/anxious.        No Known Allergies    Past Medical History:   Diagnosis Date   • Asthma 2/19/2010   • BMI 30.0-30.9,adult    • BMI 31.0-31.9,adult    • Carpal tunnel syndrome 8/6/2016   • COPD, moderate    • Hyperlipidemia 5/3/2011   • Scoliosis        Social History     Social History   • Marital status:      Spouse name: N/A   • Number of children: N/A   • Years of education: N/A     Occupational History   • Not on file.     Social History Main Topics   • Smoking status: Former Smoker   • Smokeless tobacco: Not on file   • Alcohol use No      Comment: unknown   • Drug use: No   • Sexual activity: Defer     Other Topics Concern   • Not on file     Social History Narrative        Past Surgical History:   Procedure Laterality Date   • ATHERECTOMY      Cath Atherectomy 1 with stent placement   • ILIAC VEIN ANGIOPLASTY / STENTING      PTA Iliac left       Family History   Problem Relation Age of Onset   • Other Mother      Arteriosclerotic cardiovascular disease, cerebrovascular accident   • Hyperlipidemia Mother    • Hypertension Mother    • Kidney disease Mother    • Other Father      Arteriosclerotic cardiovascular disease         Current Outpatient Prescriptions:   •  albuterol (PROVENTIL HFA;VENTOLIN HFA) 108 (90 BASE) MCG/ACT inhaler, Inhale 2 puffs every 4 (four) hours as needed for wheezing or shortness of air., Disp: 1 inhaler, Rfl: 5  •  amitriptyline (ELAVIL) 25 MG tablet, Take 1 tablet by mouth every night., Disp: 30 tablet, Rfl: 5  •  aspirin 81 MG EC tablet, Take 1 tablet by mouth 4 (four) times a day., Disp: 120 tablet, Rfl: 5  •  fluticasone (FLONASE) 50 MCG/ACT nasal spray, 1 spray into each nostril 2 (two) times a day., Disp: 1 each, Rfl: 5  •  gabapentin (NEURONTIN) 100 MG capsule, Take 1 capsule by mouth 2 (Two) Times a Day As Needed (pain)., Disp: 60 capsule, Rfl:  "3  •  ipratropium-albuterol (DUO-NEB) 0.5-2.5 mg/mL nebulizer, Take 1.5 mL by nebulization every 6 (six) hours as needed for wheezing or shortness of air., Disp: 360 mL, Rfl: 5  •  mometasone-formoterol (DULERA 200) 200-5 MCG/ACT inhaler, Inhale 2 puffs 2 (Two) Times a Day., Disp: 13 g, Rfl: 5  •  Omega-3 Fatty Acids (FISH OIL) 1000 MG capsule capsule, Take 1 capsule by mouth 2 (two) times a day., Disp: , Rfl:   •  ondansetron (ZOFRAN) 4 MG tablet, Take 1 tablet by mouth Every 8 (Eight) Hours As Needed for nausea or vomiting., Disp: 20 tablet, Rfl: 1  •  oxyCODONE-acetaminophen (PERCOCET)  MG per tablet, Take 1 tablet by mouth Every 8 (Eight) Hours As Needed for Moderate Pain (4-6)., Disp: 90 tablet, Rfl: 0  •  pantoprazole (PROTONIX) 40 MG EC tablet, Take 1 tablet by mouth daily., Disp: 30 tablet, Rfl: 3  •  prasugrel (EFFIENT) 10 MG tablet, Take 1 tablet by mouth Daily., Disp: 30 tablet, Rfl: 5  •  propranolol (INDERAL) 20 MG tablet, take 1 tablet by mouth twice a day, Disp: 60 tablet, Rfl: 5  •  ranitidine (ZANTAC) 150 MG tablet, Take 1 tablet by mouth 2 (two) times a day., Disp: 60 tablet, Rfl: 5  •  simvastatin (ZOCOR) 20 MG tablet, Take 1 tablet by mouth every night., Disp: 30 tablet, Rfl: 5  •  spironolactone (ALDACTONE) 50 MG tablet, Take 1 tablet by mouth 2 (two) times a day., Disp: 60 tablet, Rfl: 5  •  tiotropium (SPIRIVA) 18 MCG per inhalation capsule, Place 1 capsule into inhaler and inhale Daily., Disp: 30 capsule, Rfl: 5    Objective   /75 (BP Location: Right arm, Patient Position: Sitting, Cuff Size: Adult)  Pulse 89  Temp 98.3 °F (36.8 °C) (Oral)   Resp 16  Ht 62\" (157.5 cm)  Wt 165 lb (74.8 kg)  SpO2 99%  BMI 30.18 kg/m2    Physical Exam   Constitutional: She is oriented to person, place, and time. She appears well-developed and well-nourished.   HENT:   Head: Normocephalic and atraumatic.   Eyes: Conjunctivae are normal.   Pulmonary/Chest: Effort normal.   Musculoskeletal: " Normal range of motion.   Neurological: She is alert and oriented to person, place, and time.   Psychiatric: She has a normal mood and affect. Her behavior is normal.   Nursing note and vitals reviewed.    PHQ-9 Depression Screening 4/28/2017   Little interest or pleasure in doing things 3   Feeling down, depressed, or hopeless 3   Trouble falling or staying asleep, or sleeping too much 3   Feeling tired or having little energy 3   Poor appetite or overeating 3   Feeling bad about yourself - or that you are a failure or have let yourself or your family down 1   Trouble concentrating on things, such as reading the newspaper or watching television 1   Moving or speaking so slowly that other people could have noticed. Or the opposite - being so fidgety or restless that you have been moving around a lot more than usual 1   Thoughts that you would be better off dead, or of hurting yourself in some way 0   PHQ-9 Total Score 18   If you checked off any problems, how difficult have these problems made it for you to do your work, take care of things at home, or get along with other people? Somewhat difficult       Assessment/Plan   Zina was seen today for establish care.    Diagnoses and all orders for this visit:    Gastroesophageal reflux disease without esophagitis    Degeneration of intervertebral disc of lumbar region  -     gabapentin (NEURONTIN) 100 MG capsule; Take 1 capsule by mouth 2 (Two) Times a Day As Needed (pain).  -     oxyCODONE-acetaminophen (PERCOCET)  MG per tablet; Take 1 tablet by mouth Every 8 (Eight) Hours As Needed for Moderate Pain (4-6).  -     Rapid drug screen, urine; Future    Simple chronic bronchitis  -     tiotropium (SPIRIVA) 18 MCG per inhalation capsule; Place 1 capsule into inhaler and inhale Daily.  -     mometasone-formoterol (DULERA 200) 200-5 MCG/ACT inhaler; Inhale 2 puffs 2 (Two) Times a Day.      Discussion Summary:  58 yo W F presenting to transfer care, medical follow  up.    1. Chronic low back pain  - pain medications refilled.  Pt did not tolerate norco in past due to abd pain.  - UDS today  The patient has read and signed the [facility] Controlled Substance Contract.  I will continue to see patient for regular follow up appointments.  They are well controlled on their medication.  ZION has been reviewed by me and is updated every 3 months. The patient is aware of the potential for addiction and dependence.    2. COPD - inhalers refilled      Follow up:  Return for Medicare Wellness.     Patient Instructions:  Patient instructions were provided.

## 2017-05-08 DIAGNOSIS — I73.9 PERIPHERAL VASCULAR DISEASE (HCC): ICD-10-CM

## 2017-05-08 DIAGNOSIS — I10 ESSENTIAL HYPERTENSION: ICD-10-CM

## 2017-05-08 DIAGNOSIS — M51.36 DEGENERATION OF INTERVERTEBRAL DISC OF LUMBAR REGION: ICD-10-CM

## 2017-05-08 DIAGNOSIS — K21.9 GASTROESOPHAGEAL REFLUX DISEASE WITHOUT ESOPHAGITIS: ICD-10-CM

## 2017-05-08 RX ORDER — SPIRONOLACTONE 50 MG/1
TABLET, FILM COATED ORAL
Qty: 60 TABLET | Refills: 5 | Status: SHIPPED | OUTPATIENT
Start: 2017-05-08 | End: 2017-10-27 | Stop reason: SDUPTHER

## 2017-05-08 RX ORDER — PRASUGREL HCL 10 MG
TABLET ORAL
Qty: 30 TABLET | Refills: 5 | Status: SHIPPED | OUTPATIENT
Start: 2017-05-08 | End: 2017-10-27 | Stop reason: SDUPTHER

## 2017-05-08 RX ORDER — AMITRIPTYLINE HYDROCHLORIDE 25 MG/1
TABLET, FILM COATED ORAL
Qty: 30 TABLET | Refills: 5 | Status: SHIPPED | OUTPATIENT
Start: 2017-05-08 | End: 2017-07-11 | Stop reason: SDUPTHER

## 2017-05-08 RX ORDER — RANITIDINE 150 MG/1
TABLET ORAL
Qty: 60 TABLET | Refills: 5 | Status: SHIPPED | OUTPATIENT
Start: 2017-05-08 | End: 2017-10-27 | Stop reason: SDUPTHER

## 2017-06-14 ENCOUNTER — OFFICE VISIT (OUTPATIENT)
Dept: FAMILY MEDICINE CLINIC | Facility: CLINIC | Age: 57
End: 2017-06-14

## 2017-06-14 VITALS
HEIGHT: 62 IN | WEIGHT: 163 LBS | SYSTOLIC BLOOD PRESSURE: 145 MMHG | TEMPERATURE: 99.1 F | DIASTOLIC BLOOD PRESSURE: 86 MMHG | HEART RATE: 83 BPM | BODY MASS INDEX: 30 KG/M2 | OXYGEN SATURATION: 97 %

## 2017-06-14 DIAGNOSIS — M51.36 DEGENERATION OF INTERVERTEBRAL DISC OF LUMBAR REGION: ICD-10-CM

## 2017-06-14 PROCEDURE — 99214 OFFICE O/P EST MOD 30 MIN: CPT | Performed by: INTERNAL MEDICINE

## 2017-06-14 RX ORDER — OXYCODONE AND ACETAMINOPHEN 10; 325 MG/1; MG/1
1 TABLET ORAL EVERY 8 HOURS PRN
Qty: 90 TABLET | Refills: 0 | Status: SHIPPED | OUTPATIENT
Start: 2017-06-14 | End: 2017-07-11 | Stop reason: SDUPTHER

## 2017-06-14 NOTE — PROGRESS NOTES
Chief Complaint   Patient presents with   • Follow-up     med refill       Subjective     History of Present Illness   Zina Hameed is a 57 y.o. female presenting for follow-up on chronic pain.  Patient continues to benefit with her current regimen.  Patient is requesting refills.  Chronic medical issues remained stable.  Patient is able to spread out her tablets rather than taking her pills on a regular basis.    The following portions of the patient's history were reviewed and updated as appropriate: allergies, current medications, past family history, past medical history, past social history, past surgical history and problem list.    Review of Systems   Constitutional: Negative for chills, fatigue and fever.   HENT: Negative for congestion, ear pain, rhinorrhea, sinus pressure and sore throat.    Eyes: Negative for visual disturbance.   Respiratory: Negative for cough, chest tightness, shortness of breath and wheezing.    Cardiovascular: Negative for chest pain, palpitations and leg swelling.   Gastrointestinal: Negative for abdominal pain, blood in stool, constipation, diarrhea, nausea and vomiting.   Endocrine: Negative for polydipsia and polyuria.   Genitourinary: Negative for dysuria and hematuria.   Musculoskeletal: Negative for back pain.   Skin: Negative for rash.   Neurological: Negative for dizziness, light-headedness, numbness and headaches.   Psychiatric/Behavioral: Negative for dysphoric mood and sleep disturbance. The patient is not nervous/anxious.        No Known Allergies    Past Medical History:   Diagnosis Date   • Asthma 2/19/2010   • BMI 30.0-30.9,adult    • BMI 31.0-31.9,adult    • Carpal tunnel syndrome 8/6/2016   • COPD, moderate    • Hyperlipidemia 5/3/2011   • Scoliosis        Social History     Social History   • Marital status:      Spouse name: N/A   • Number of children: N/A   • Years of education: N/A     Occupational History   • Not on file.     Social History Main  Topics   • Smoking status: Former Smoker   • Smokeless tobacco: Not on file   • Alcohol use No      Comment: unknown   • Drug use: No   • Sexual activity: Defer     Other Topics Concern   • Not on file     Social History Narrative        Past Surgical History:   Procedure Laterality Date   • ATHERECTOMY      Cath Atherectomy 1 with stent placement   • ILIAC VEIN ANGIOPLASTY / STENTING      PTA Iliac left       Family History   Problem Relation Age of Onset   • Other Mother      Arteriosclerotic cardiovascular disease, cerebrovascular accident   • Hyperlipidemia Mother    • Hypertension Mother    • Kidney disease Mother    • Other Father      Arteriosclerotic cardiovascular disease         Current Outpatient Prescriptions:   •  albuterol (PROVENTIL HFA;VENTOLIN HFA) 108 (90 BASE) MCG/ACT inhaler, Inhale 2 puffs every 4 (four) hours as needed for wheezing or shortness of air., Disp: 1 inhaler, Rfl: 5  •  amitriptyline (ELAVIL) 25 MG tablet, take 1 tablet by mouth at bedtime, Disp: 30 tablet, Rfl: 5  •  aspirin 81 MG EC tablet, Take 1 tablet by mouth 4 (four) times a day., Disp: 120 tablet, Rfl: 5  •  EFFIENT 10 MG tablet, take 1 tablet by mouth once daily, Disp: 30 tablet, Rfl: 5  •  fluticasone (FLONASE) 50 MCG/ACT nasal spray, 1 spray into each nostril 2 (two) times a day., Disp: 1 each, Rfl: 5  •  gabapentin (NEURONTIN) 100 MG capsule, Take 1 capsule by mouth 2 (Two) Times a Day As Needed (pain)., Disp: 60 capsule, Rfl: 3  •  ipratropium-albuterol (DUO-NEB) 0.5-2.5 mg/mL nebulizer, Take 1.5 mL by nebulization every 6 (six) hours as needed for wheezing or shortness of air., Disp: 360 mL, Rfl: 5  •  mometasone-formoterol (DULERA 200) 200-5 MCG/ACT inhaler, Inhale 2 puffs 2 (Two) Times a Day., Disp: 13 g, Rfl: 5  •  Omega-3 Fatty Acids (FISH OIL) 1000 MG capsule capsule, Take 1 capsule by mouth 2 (two) times a day., Disp: , Rfl:   •  ondansetron (ZOFRAN) 4 MG tablet, Take 1 tablet by mouth Every 8 (Eight) Hours As  "Needed for nausea or vomiting., Disp: 20 tablet, Rfl: 1  •  oxyCODONE-acetaminophen (PERCOCET)  MG per tablet, Take 1 tablet by mouth Every 8 (Eight) Hours As Needed for Moderate Pain (4-6)., Disp: 90 tablet, Rfl: 0  •  pantoprazole (PROTONIX) 40 MG EC tablet, Take 1 tablet by mouth daily., Disp: 30 tablet, Rfl: 3  •  propranolol (INDERAL) 20 MG tablet, take 1 tablet by mouth twice a day, Disp: 60 tablet, Rfl: 5  •  raNITIdine (ZANTAC) 150 MG tablet, take 1 tablet by mouth twice a day, Disp: 60 tablet, Rfl: 5  •  simvastatin (ZOCOR) 20 MG tablet, Take 1 tablet by mouth every night., Disp: 30 tablet, Rfl: 5  •  spironolactone (ALDACTONE) 50 MG tablet, take 1 tablet by mouth twice a day, Disp: 60 tablet, Rfl: 5  •  tiotropium (SPIRIVA) 18 MCG per inhalation capsule, Place 1 capsule into inhaler and inhale Daily., Disp: 30 capsule, Rfl: 5    Objective   /86 (BP Location: Left arm)  Pulse 83  Temp 99.1 °F (37.3 °C)  Ht 62\" (157.5 cm)  Wt 163 lb (73.9 kg)  SpO2 97%  BMI 29.81 kg/m2    Physical Exam   Constitutional: She is oriented to person, place, and time. She appears well-developed and well-nourished.   HENT:   Head: Normocephalic and atraumatic.   Eyes: Conjunctivae are normal.   Pulmonary/Chest: Effort normal.   Musculoskeletal: Normal range of motion.   Neurological: She is alert and oriented to person, place, and time.   Psychiatric: She has a normal mood and affect. Her behavior is normal.   Nursing note and vitals reviewed.      Assessment/Plan   Zina was seen today for follow-up.    Diagnoses and all orders for this visit:    Degeneration of intervertebral disc of lumbar region  -     oxyCODONE-acetaminophen (PERCOCET)  MG per tablet; Take 1 tablet by mouth Every 8 (Eight) Hours As Needed for Moderate Pain (4-6).        Discussion Summary:  57-year-old white female presenting for follow-up on chronic pain.  Pain medications were refilled.  Former UDS reviewed and appropriate.  - " Patient is aware he/she is being prescribed a high risk controlled substance. A ZION was reviewed and appropriate prior to providing prescriptions for controlled substances.      Follow up:  No Follow-up on file.     Patient Instructions:  Patient instructions were provided.

## 2017-07-11 ENCOUNTER — OFFICE VISIT (OUTPATIENT)
Dept: FAMILY MEDICINE CLINIC | Facility: CLINIC | Age: 57
End: 2017-07-11

## 2017-07-11 VITALS
WEIGHT: 164.4 LBS | HEART RATE: 82 BPM | HEIGHT: 62 IN | BODY MASS INDEX: 30.25 KG/M2 | SYSTOLIC BLOOD PRESSURE: 134 MMHG | DIASTOLIC BLOOD PRESSURE: 78 MMHG | OXYGEN SATURATION: 98 % | TEMPERATURE: 97.9 F

## 2017-07-11 DIAGNOSIS — M51.36 DEGENERATION OF INTERVERTEBRAL DISC OF LUMBAR REGION: ICD-10-CM

## 2017-07-11 PROCEDURE — G0438 PPPS, INITIAL VISIT: HCPCS | Performed by: INTERNAL MEDICINE

## 2017-07-11 PROCEDURE — 99214 OFFICE O/P EST MOD 30 MIN: CPT | Performed by: INTERNAL MEDICINE

## 2017-07-11 RX ORDER — AMITRIPTYLINE HYDROCHLORIDE 50 MG/1
50 TABLET, FILM COATED ORAL NIGHTLY
Qty: 30 TABLET | Refills: 5 | Status: SHIPPED | OUTPATIENT
Start: 2017-07-11 | End: 2017-10-27 | Stop reason: SDUPTHER

## 2017-07-11 RX ORDER — ALBUTEROL SULFATE 90 UG/1
2 AEROSOL, METERED RESPIRATORY (INHALATION) AS NEEDED
COMMUNITY
Start: 2010-10-29 | End: 2017-10-27

## 2017-07-11 RX ORDER — OXYCODONE AND ACETAMINOPHEN 10; 325 MG/1; MG/1
1 TABLET ORAL EVERY 8 HOURS PRN
Qty: 90 TABLET | Refills: 0 | Status: SHIPPED | OUTPATIENT
Start: 2017-07-11 | End: 2017-08-18 | Stop reason: SDUPTHER

## 2017-07-11 NOTE — PROGRESS NOTES
QUICK REFERENCE INFORMATION:  The ABCs of the Annual Wellness Visit    Initial Medicare Wellness Visit    HEALTH RISK ASSESSMENT    1960    Recent Hospitalizations:  No hospitalization(s) within the last year..        Current Medical Providers:  Patient Care Team:  Velasquez Fox DO as PCP - General (Internal Medicine)  Young Fraire DO as PCP - Claims Attributed        Smoking Status:  History   Smoking Status   • Former Smoker   Smokeless Tobacco   • Not on file       Alcohol Consumption:  History   Alcohol Use No     Comment: unknown       Depression Screen:   PHQ-9 Depression Screening 7/11/2017   Little interest or pleasure in doing things 0   Feeling down, depressed, or hopeless 1   Trouble falling or staying asleep, or sleeping too much 3   Feeling tired or having little energy 2   Poor appetite or overeating 1   Feeling bad about yourself - or that you are a failure or have let yourself or your family down 1   Trouble concentrating on things, such as reading the newspaper or watching television 0   Moving or speaking so slowly that other people could have noticed. Or the opposite - being so fidgety or restless that you have been moving around a lot more than usual 1   Thoughts that you would be better off dead, or of hurting yourself in some way 0   PHQ-9 Total Score 9   If you checked off any problems, how difficult have these problems made it for you to do your work, take care of things at home, or get along with other people? -       Health Habits and Functional and Cognitive Screening:  Functional & Cognitive Status 7/11/2017   Do you have difficulty preparing food and eating? No   Do you have difficulty bathing yourself? No   Do you have difficulty getting dressed? Yes   Do you have difficulty using the toilet? No   Do you have difficulty moving around from place to place? Yes   In the past year have you fallen or experienced a near fall? No   Do you need help using the phone?  No   Are you  deaf or do you have serious difficulty hearing?  No   Do you need help with transportation? No   Do you need help shopping? Yes   Do you need help preparing meals?  No   Do you need help with housework?  Yes   Do you need help with laundry? No   Do you need help taking your medications? No   Do you need help managing money? No   Do you have difficulty concentrating, remembering or making decisions? No       Health Habits  Current Diet: Low Fat Diet  Dental Exam: Not up to date  Eye Exam: Not up to date  Exercise (times per week): 1 times per week  Current Exercise Activities Include: Walking          Does the patient have evidence of cognitive impairment? No    Asiprin use counseling: Start ASA 81 mg daily       Recent Lab Results:    Visual Acuity:  No exam data present    Age-appropriate Screening Schedule:  Refer to the list below for future screening recommendations based on patient's age, sex and/or medical conditions. Orders for these recommended tests are listed in the plan section. The patient has been provided with a written plan.    Health Maintenance   Topic Date Due   • INFLUENZA VACCINE  08/01/2017   • LIPID PANEL  02/28/2018   • MAMMOGRAM  03/28/2019   • PAP SMEAR  03/28/2020   • COLONOSCOPY  02/01/2022   • TDAP/TD VACCINES (2 - Td) 03/28/2027        Subjective   History of Present Illness    Zina Hameed is a 57 y.o. female who presents for an Annual Wellness Visit. Chronic medical issues were reviewed and discussed.  Patient presents with chronic GERD symptoms.  She continues to take pantoprazole and Zantac regularly.  She has been avoiding spicy foods and tomato aced foods regularly.  If symptoms worsen, she takes Pepto-Bismol.  She feels her symptoms are currently stable.  Patient needs pain medication refills.  Chronic COPD remained stable, she denies any significant shortness of breath, cough or wheezing.    The following portions of the patient's history were reviewed and updated as  appropriate: allergies, current medications, past family history, past medical history, past social history, past surgical history and problem list.    Outpatient Medications Prior to Visit   Medication Sig Dispense Refill   • albuterol (PROVENTIL HFA;VENTOLIN HFA) 108 (90 BASE) MCG/ACT inhaler Inhale 2 puffs every 4 (four) hours as needed for wheezing or shortness of air. 1 inhaler 5   • aspirin 81 MG EC tablet Take 1 tablet by mouth 4 (four) times a day. 120 tablet 5   • EFFIENT 10 MG tablet take 1 tablet by mouth once daily 30 tablet 5   • fluticasone (FLONASE) 50 MCG/ACT nasal spray 1 spray into each nostril 2 (two) times a day. 1 each 5   • gabapentin (NEURONTIN) 100 MG capsule Take 1 capsule by mouth 2 (Two) Times a Day As Needed (pain). 60 capsule 3   • ipratropium-albuterol (DUO-NEB) 0.5-2.5 mg/mL nebulizer Take 1.5 mL by nebulization every 6 (six) hours as needed for wheezing or shortness of air. 360 mL 5   • mometasone-formoterol (DULERA 200) 200-5 MCG/ACT inhaler Inhale 2 puffs 2 (Two) Times a Day. 13 g 5   • Omega-3 Fatty Acids (FISH OIL) 1000 MG capsule capsule Take 1 capsule by mouth 2 (two) times a day.     • ondansetron (ZOFRAN) 4 MG tablet Take 1 tablet by mouth Every 8 (Eight) Hours As Needed for nausea or vomiting. 20 tablet 1   • pantoprazole (PROTONIX) 40 MG EC tablet Take 1 tablet by mouth daily. 30 tablet 3   • propranolol (INDERAL) 20 MG tablet take 1 tablet by mouth twice a day 60 tablet 5   • raNITIdine (ZANTAC) 150 MG tablet take 1 tablet by mouth twice a day 60 tablet 5   • simvastatin (ZOCOR) 20 MG tablet Take 1 tablet by mouth every night. 30 tablet 5   • spironolactone (ALDACTONE) 50 MG tablet take 1 tablet by mouth twice a day 60 tablet 5   • tiotropium (SPIRIVA) 18 MCG per inhalation capsule Place 1 capsule into inhaler and inhale Daily. 30 capsule 5   • amitriptyline (ELAVIL) 25 MG tablet take 1 tablet by mouth at bedtime 30 tablet 5   • oxyCODONE-acetaminophen (PERCOCET)  MG  per tablet Take 1 tablet by mouth Every 8 (Eight) Hours As Needed for Moderate Pain (4-6). 90 tablet 0     No facility-administered medications prior to visit.        Patient Active Problem List   Diagnosis   • Peripheral vascular disease   • Plantar fasciitis   • Degeneration of intervertebral disc of lumbar region   • Hypertension   • Dyslipidemia   • Chronic obstructive pulmonary disease   • Atopic rhinitis   • Gastroesophageal reflux disease   • Chronic constipation   • Carpal tunnel syndrome   • Screening mammogram, encounter for   • Back pain   • Hyperlipidemia   • Chest pain   • Disorder of thyroid   • Tobacco use   • Asthma       Advance Care Planning:  has NO advance directive - information provided to the patient today    Identification of Risk Factors:  Risk factors include: weight , inactivity, chronic pain and polypharmacy.    Review of Systems   Constitutional: Negative for chills, fatigue and fever.   HENT: Negative for congestion, ear pain, rhinorrhea, sinus pressure and sore throat.    Eyes: Negative for visual disturbance.   Respiratory: Negative for cough, chest tightness, shortness of breath and wheezing.    Cardiovascular: Negative for chest pain, palpitations and leg swelling.   Gastrointestinal: Negative for abdominal pain, blood in stool, constipation, diarrhea, nausea and vomiting.   Endocrine: Negative for polydipsia and polyuria.   Genitourinary: Negative for dysuria and hematuria.   Musculoskeletal: Negative for back pain.   Skin: Negative for rash.   Neurological: Negative for dizziness, light-headedness, numbness and headaches.   Psychiatric/Behavioral: Negative for dysphoric mood and sleep disturbance. The patient is not nervous/anxious.        Compared to one year ago, the patient feels her physical health is the same.  Compared to one year ago, the patient feels her mental health is the same.    Objective     Physical Exam   Constitutional: She is oriented to person, place, and time.  "She appears well-developed and well-nourished.   HENT:   Head: Normocephalic and atraumatic.   Eyes: Conjunctivae are normal.   Pulmonary/Chest: Effort normal.   Musculoskeletal: Normal range of motion.   Neurological: She is alert and oriented to person, place, and time.   Psychiatric: She has a normal mood and affect. Her behavior is normal.   Nursing note and vitals reviewed.      Vitals:    07/11/17 0912   BP: 134/78   BP Location: Right arm   Patient Position: Sitting   Pulse: 82   Temp: 97.9 °F (36.6 °C)   TempSrc: Oral   SpO2: 98%   Weight: 164 lb 6.4 oz (74.6 kg)   Height: 62\" (157.5 cm)       Body mass index is 30.07 kg/(m^2).  Discussed the patient's BMI with her. The BMI is in the acceptable range.    Assessment/Plan   Patient Self-Management and Personalized Health Advice  The patient has been provided with information about: diet, exercise and designing advance directives and preventive services including:   · Advance directive.    Visit Diagnoses:    ICD-10-CM ICD-9-CM   1. Degeneration of intervertebral disc of lumbar region M51.36 722.52   57-year-old white female presenting for Medicare wellness exam.  Medical issues were also discussed today.  1.  Chronic COPD - stable  2.  Hypertension-stable  3.  Chronic pain-pain medications refilled.  - Patient is aware he/she is being prescribed a high risk controlled substance. A ZION was reviewed and appropriate prior to providing prescriptions for controlled substances.  4.  Chronic GERD -   -Should symptoms worsen, consider GI referral and workup.  Currently stable on Zantac and Protonix.    No orders of the defined types were placed in this encounter.      Outpatient Encounter Prescriptions as of 7/11/2017   Medication Sig Dispense Refill   • albuterol (PROVENTIL HFA;VENTOLIN HFA) 108 (90 BASE) MCG/ACT inhaler Inhale 2 puffs every 4 (four) hours as needed for wheezing or shortness of air. 1 inhaler 5   • albuterol (PROVENTIL HFA;VENTOLIN HFA) 108 (90 " BASE) MCG/ACT inhaler Inhale 2 puffs As Needed.     • amitriptyline (ELAVIL) 50 MG tablet Take 1 tablet by mouth Every Night. 30 tablet 5   • aspirin 81 MG EC tablet Take 1 tablet by mouth 4 (four) times a day. 120 tablet 5   • EFFIENT 10 MG tablet take 1 tablet by mouth once daily 30 tablet 5   • fluticasone (FLONASE) 50 MCG/ACT nasal spray 1 spray into each nostril 2 (two) times a day. 1 each 5   • gabapentin (NEURONTIN) 100 MG capsule Take 1 capsule by mouth 2 (Two) Times a Day As Needed (pain). 60 capsule 3   • ipratropium-albuterol (DUO-NEB) 0.5-2.5 mg/mL nebulizer Take 1.5 mL by nebulization every 6 (six) hours as needed for wheezing or shortness of air. 360 mL 5   • mometasone-formoterol (DULERA 200) 200-5 MCG/ACT inhaler Inhale 2 puffs 2 (Two) Times a Day. 13 g 5   • Omega-3 Fatty Acids (FISH OIL) 1000 MG capsule capsule Take 1 capsule by mouth 2 (two) times a day.     • ondansetron (ZOFRAN) 4 MG tablet Take 1 tablet by mouth Every 8 (Eight) Hours As Needed for nausea or vomiting. 20 tablet 1   • oxyCODONE-acetaminophen (PERCOCET)  MG per tablet Take 1 tablet by mouth Every 8 (Eight) Hours As Needed for Moderate Pain (4-6). 90 tablet 0   • pantoprazole (PROTONIX) 40 MG EC tablet Take 1 tablet by mouth daily. 30 tablet 3   • propranolol (INDERAL) 20 MG tablet take 1 tablet by mouth twice a day 60 tablet 5   • raNITIdine (ZANTAC) 150 MG tablet take 1 tablet by mouth twice a day 60 tablet 5   • simvastatin (ZOCOR) 20 MG tablet Take 1 tablet by mouth every night. 30 tablet 5   • spironolactone (ALDACTONE) 50 MG tablet take 1 tablet by mouth twice a day 60 tablet 5   • tiotropium (SPIRIVA) 18 MCG per inhalation capsule Place 1 capsule into inhaler and inhale Daily. 30 capsule 5   • [DISCONTINUED] amitriptyline (ELAVIL) 25 MG tablet take 1 tablet by mouth at bedtime 30 tablet 5   • [DISCONTINUED] oxyCODONE-acetaminophen (PERCOCET)  MG per tablet Take 1 tablet by mouth Every 8 (Eight) Hours As Needed  for Moderate Pain (4-6). 90 tablet 0     No facility-administered encounter medications on file as of 7/11/2017.        Reviewed use of high risk medication in the elderly: yes  Reviewed for potential of harmful drug interactions in the elderly: yes    Follow Up:  Return in about 1 month (around 8/11/2017) for Med Refills.     An After Visit Summary and PPPS with all of these plans were given to the patient.

## 2017-07-12 DIAGNOSIS — K21.9 GASTROESOPHAGEAL REFLUX DISEASE WITHOUT ESOPHAGITIS: ICD-10-CM

## 2017-07-13 RX ORDER — PANTOPRAZOLE SODIUM 40 MG/1
TABLET, DELAYED RELEASE ORAL
Qty: 30 TABLET | Refills: 5 | Status: SHIPPED | OUTPATIENT
Start: 2017-07-13 | End: 2017-10-27 | Stop reason: SDUPTHER

## 2017-08-18 ENCOUNTER — OFFICE VISIT (OUTPATIENT)
Dept: FAMILY MEDICINE CLINIC | Facility: CLINIC | Age: 57
End: 2017-08-18

## 2017-08-18 VITALS
OXYGEN SATURATION: 98 % | BODY MASS INDEX: 30.14 KG/M2 | SYSTOLIC BLOOD PRESSURE: 132 MMHG | TEMPERATURE: 98 F | HEART RATE: 85 BPM | HEIGHT: 62 IN | WEIGHT: 163.8 LBS | DIASTOLIC BLOOD PRESSURE: 89 MMHG

## 2017-08-18 DIAGNOSIS — J41.0 SIMPLE CHRONIC BRONCHITIS (HCC): ICD-10-CM

## 2017-08-18 DIAGNOSIS — M51.36 DEGENERATION OF INTERVERTEBRAL DISC OF LUMBAR REGION: ICD-10-CM

## 2017-08-18 DIAGNOSIS — M72.2 PLANTAR FASCIITIS, BILATERAL: Primary | ICD-10-CM

## 2017-08-18 PROCEDURE — 99214 OFFICE O/P EST MOD 30 MIN: CPT | Performed by: INTERNAL MEDICINE

## 2017-08-18 RX ORDER — OXYCODONE AND ACETAMINOPHEN 10; 325 MG/1; MG/1
1 TABLET ORAL EVERY 8 HOURS PRN
Qty: 90 TABLET | Refills: 0 | Status: SHIPPED | OUTPATIENT
Start: 2017-08-18 | End: 2017-09-19 | Stop reason: SDUPTHER

## 2017-08-18 NOTE — PROGRESS NOTES
Chief Complaint   Patient presents with   • Follow-up     Patient is here for medicine refill.       Subjective     History of Present Illness   Zina Hameed is a 57 y.o. female presenting for chronic pain.  Patient is requesting refills.  Pain symptoms have been well controlled with the current regimen.  Typically takes pain pill in AM and then one in PM (half or full).  Recently walking more to lose weight.     The following portions of the patient's history were reviewed and updated as appropriate: allergies, current medications, past family history, past medical history, past social history, past surgical history and problem list.    Review of Systems   Constitutional: Negative for chills, fatigue and fever.   HENT: Negative for congestion, ear pain, rhinorrhea, sinus pressure and sore throat.    Eyes: Negative for visual disturbance.   Respiratory: Negative for cough, chest tightness, shortness of breath and wheezing.    Cardiovascular: Negative for chest pain, palpitations and leg swelling.   Gastrointestinal: Negative for abdominal pain, blood in stool, constipation, diarrhea, nausea and vomiting.   Endocrine: Negative for polydipsia and polyuria.   Genitourinary: Negative for dysuria and hematuria.   Musculoskeletal: Positive for arthralgias and back pain.   Skin: Negative for rash.   Neurological: Negative for dizziness, light-headedness, numbness and headaches.   Psychiatric/Behavioral: Negative for dysphoric mood and sleep disturbance. The patient is not nervous/anxious.        No Known Allergies    Past Medical History:   Diagnosis Date   • Asthma 2/19/2010   • BMI 30.0-30.9,adult    • BMI 31.0-31.9,adult    • Carpal tunnel syndrome 8/6/2016   • COPD, moderate    • Hyperlipidemia 5/3/2011   • Scoliosis        Social History     Social History   • Marital status:      Spouse name: N/A   • Number of children: N/A   • Years of education: N/A     Occupational History   • Not on file.     Social  History Main Topics   • Smoking status: Former Smoker   • Smokeless tobacco: Not on file   • Alcohol use No      Comment: unknown   • Drug use: No   • Sexual activity: Defer     Other Topics Concern   • Not on file     Social History Narrative        Past Surgical History:   Procedure Laterality Date   • ATHERECTOMY      Cath Atherectomy 1 with stent placement   • ILIAC VEIN ANGIOPLASTY / STENTING      PTA Iliac left       Family History   Problem Relation Age of Onset   • Other Mother      Arteriosclerotic cardiovascular disease, cerebrovascular accident   • Hyperlipidemia Mother    • Hypertension Mother    • Kidney disease Mother    • Other Father      Arteriosclerotic cardiovascular disease         Current Outpatient Prescriptions:   •  albuterol (PROVENTIL HFA;VENTOLIN HFA) 108 (90 BASE) MCG/ACT inhaler, Inhale 2 puffs every 4 (four) hours as needed for wheezing or shortness of air., Disp: 1 inhaler, Rfl: 5  •  albuterol (PROVENTIL HFA;VENTOLIN HFA) 108 (90 BASE) MCG/ACT inhaler, Inhale 2 puffs As Needed., Disp: , Rfl:   •  amitriptyline (ELAVIL) 50 MG tablet, Take 1 tablet by mouth Every Night., Disp: 30 tablet, Rfl: 5  •  aspirin 81 MG EC tablet, Take 1 tablet by mouth 4 (four) times a day., Disp: 120 tablet, Rfl: 5  •  EFFIENT 10 MG tablet, take 1 tablet by mouth once daily, Disp: 30 tablet, Rfl: 5  •  fluticasone (FLONASE) 50 MCG/ACT nasal spray, 1 spray into each nostril 2 (two) times a day., Disp: 1 each, Rfl: 5  •  gabapentin (NEURONTIN) 100 MG capsule, Take 1 capsule by mouth 2 (Two) Times a Day As Needed (pain)., Disp: 60 capsule, Rfl: 3  •  ipratropium-albuterol (DUO-NEB) 0.5-2.5 mg/mL nebulizer, Take 1.5 mL by nebulization every 6 (six) hours as needed for wheezing or shortness of air., Disp: 360 mL, Rfl: 5  •  mometasone-formoterol (DULERA 200) 200-5 MCG/ACT inhaler, Inhale 2 puffs 2 (Two) Times a Day., Disp: 13 g, Rfl: 5  •  Omega-3 Fatty Acids (FISH OIL) 1000 MG capsule capsule, Take 1 capsule by  "mouth 2 (two) times a day., Disp: , Rfl:   •  ondansetron (ZOFRAN) 4 MG tablet, Take 1 tablet by mouth Every 8 (Eight) Hours As Needed for nausea or vomiting., Disp: 20 tablet, Rfl: 1  •  oxyCODONE-acetaminophen (PERCOCET)  MG per tablet, Take 1 tablet by mouth Every 8 (Eight) Hours As Needed for Moderate Pain (4-6)., Disp: 90 tablet, Rfl: 0  •  pantoprazole (PROTONIX) 40 MG EC tablet, Take 1 tablet by mouth daily., Disp: 30 tablet, Rfl: 3  •  pantoprazole (PROTONIX) 40 MG EC tablet, take 1 tablet by mouth once daily, Disp: 30 tablet, Rfl: 5  •  raNITIdine (ZANTAC) 150 MG tablet, take 1 tablet by mouth twice a day, Disp: 60 tablet, Rfl: 5  •  simvastatin (ZOCOR) 20 MG tablet, Take 1 tablet by mouth every night., Disp: 30 tablet, Rfl: 5  •  spironolactone (ALDACTONE) 50 MG tablet, take 1 tablet by mouth twice a day, Disp: 60 tablet, Rfl: 5  •  tiotropium (SPIRIVA) 18 MCG per inhalation capsule, Place 1 capsule into inhaler and inhale Daily., Disp: 30 capsule, Rfl: 5  •  propranolol (INDERAL) 20 MG tablet, take 1 tablet by mouth twice a day, Disp: 60 tablet, Rfl: 5    Objective   /89 (BP Location: Right arm, Patient Position: Sitting)  Pulse 85  Temp 98 °F (36.7 °C) (Oral)   Ht 62\" (157.5 cm)  Wt 163 lb 12.8 oz (74.3 kg)  SpO2 98%  BMI 29.96 kg/m2    Physical Exam   Constitutional: She is oriented to person, place, and time. She appears well-developed and well-nourished.   HENT:   Head: Normocephalic and atraumatic.   Eyes: Conjunctivae are normal.   Pulmonary/Chest: Effort normal.   Musculoskeletal: Normal range of motion.   Neurological: She is alert and oriented to person, place, and time.   Psychiatric: She has a normal mood and affect. Her behavior is normal.   Nursing note and vitals reviewed.      Assessment/Plan   Zina was seen today for follow-up.    Diagnoses and all orders for this visit:    Degeneration of intervertebral disc of lumbar region  -     oxyCODONE-acetaminophen (PERCOCET) "  MG per tablet; Take 1 tablet by mouth Every 8 (Eight) Hours As Needed for Moderate Pain .    Simple chronic bronchitis  -     mometasone-formoterol (DULERA 200) 200-5 MCG/ACT inhaler; Inhale 2 puffs 2 (Two) Times a Day.          Discussion Summary:    1. Chronic pain   - medications were refilled as patient continues to benefit from the medications.   - UDS performed, prior UDS results reviewed and appropriate  - Patient is aware he/she is being prescribed a high risk controlled substance. A ZION was reviewed and appropriate prior to providing prescriptions for controlled substances.  - last dose one week ago.     2. Plantar fasciitis - bilateral    3. Obesity  - Weight loss options were discussed in detail including reducing fried, fatty, and sugary foods with diet control. A regular exercise regimen was discussed.    4. GERD   - cont protonix and     Follow up:  No Follow-up on file.     Patient Instructions:  Patient instructions were provided.

## 2017-08-18 NOTE — PATIENT INSTRUCTIONS
Food Choices for Gastroesophageal Reflux Disease, Adult  When you have gastroesophageal reflux disease (GERD), the foods you eat and your eating habits are very important. Choosing the right foods can help ease the discomfort of GERD.  WHAT GENERAL GUIDELINES DO I NEED TO FOLLOW?  · Choose fruits, vegetables, whole grains, low-fat dairy products, and low-fat meat, fish, and poultry.  · Limit fats such as oils, salad dressings, butter, nuts, and avocado.  · Keep a food diary to identify foods that cause symptoms.  · Avoid foods that cause reflux. These may be different for different people.  · Eat frequent small meals instead of three large meals each day.  · Eat your meals slowly, in a relaxed setting.  · Limit fried foods.  · Cook foods using methods other than frying.  · Avoid drinking alcohol.  · Avoid drinking large amounts of liquids with your meals.  · Avoid bending over or lying down until 2-3 hours after eating.  WHAT FOODS ARE NOT RECOMMENDED?  The following are some foods and drinks that may worsen your symptoms:  Vegetables  Tomatoes. Tomato juice. Tomato and spaghetti sauce. Chili peppers. Onion and garlic. Horseradish.  Fruits  Oranges, grapefruit, and lemon (fruit and juice).  Meats  High-fat meats, fish, and poultry. This includes hot dogs, ribs, ham, sausage, salami, and booth.  Dairy  Whole milk and chocolate milk. Sour cream. Cream. Butter. Ice cream. Cream cheese.   Beverages  Coffee and tea, with or without caffeine. Carbonated beverages or energy drinks.  Condiments  Hot sauce. Barbecue sauce.   Sweets/Desserts  Chocolate and cocoa. Donuts. Peppermint and spearmint.  Fats and Oils  High-fat foods, including French fries and potato chips.  Other  Vinegar. Strong spices, such as black pepper, white pepper, red pepper, cayenne, newby powder, cloves, ginger, and chili powder.  The items listed above may not be a complete list of foods and beverages to avoid. Contact your dietitian for more  information.     This information is not intended to replace advice given to you by your health care provider. Make sure you discuss any questions you have with your health care provider.     Document Released: 12/18/2006 Document Revised: 01/08/2016 Document Reviewed: 10/22/2014  [employer] Interactive Patient Education ©2017 [employer]      Calorie Counting for Weight Loss  Calories are energy you get from the things you eat and drink. Your body uses this energy to keep you going throughout the day. The number of calories you eat affects your weight. When you eat more calories than your body needs, your body stores the extra calories as fat. When you eat fewer calories than your body needs, your body burns fat to get the energy it needs.  Calorie counting means keeping track of how many calories you eat and drink each day. If you make sure to eat fewer calories than your body needs, you should lose weight. In order for calorie counting to work, you will need to eat the number of calories that are right for you in a day to lose a healthy amount of weight per week. A healthy amount of weight to lose per week is usually 1-2 lb (0.5-0.9 kg). A dietitian can determine how many calories you need in a day and give you suggestions on how to reach your calorie goal.   WHAT IS MY MY PLAN?  My goal is to have __________ calories per day.   If I have this many calories per day, I should lose around __________ pounds per week.  WHAT DO I NEED TO KNOW ABOUT CALORIE COUNTING?  In order to meet your daily calorie goal, you will need to:  · Find out how many calories are in each food you would like to eat. Try to do this before you eat.  · Decide how much of the food you can eat.  · Write down what you ate and how many calories it had. Doing this is called keeping a food log.  WHERE DO I FIND CALORIE INFORMATION?  The number of calories in a food can be found on a Nutrition Facts label. Note that all the information on a label is  based on a specific serving of the food. If a food does not have a Nutrition Facts label, try to look up the calories online or ask your dietitian for help.  HOW DO I DECIDE HOW MUCH TO EAT?  To decide how much of the food you can eat, you will need to consider both the number of calories in one serving and the size of one serving. This information can be found on the Nutrition Facts label. If a food does not have a Nutrition Facts label, look up the information online or ask your dietitian for help.  Remember that calories are listed per serving. If you choose to have more than one serving of a food, you will have to multiply the calories per serving by the amount of servings you plan to eat. For example, the label on a package of bread might say that a serving size is 1 slice and that there are 90 calories in a serving. If you eat 1 slice, you will have eaten 90 calories. If you eat 2 slices, you will have eaten 180 calories.  HOW DO I KEEP A FOOD LOG?  After each meal, record the following information in your food log:  · What you ate.  · How much of it you ate.  · How many calories it had.  · Then, add up your calories.  Keep your food log near you, such as in a small notebook in your pocket. Another option is to use a mobile katey or website. Some programs will calculate calories for you and show you how many calories you have left each time you add an item to the log.  WHAT ARE SOME CALORIE COUNTING TIPS?  · Use your calories on foods and drinks that will fill you up and not leave you hungry. Some examples of this include foods like nuts and nut butters, vegetables, lean proteins, and high-fiber foods (more than 5 g fiber per serving).  · Eat nutritious foods and avoid empty calories. Empty calories are calories you get from foods or beverages that do not have many nutrients, such as candy and soda. It is better to have a nutritious high-calorie food (such as an avocado) than a food with few nutrients (such as a  bag of chips).  · Know how many calories are in the foods you eat most often. This way, you do not have to look up how many calories they have each time you eat them.  · Look out for foods that may seem like low-calorie foods but are really high-calorie foods, such as baked goods, soda, and fat-free candy.  · Pay attention to calories in drinks. Drinks such as sodas, specialty coffee drinks, alcohol, and juices have a lot of calories yet do not fill you up. Choose low-calorie drinks like water and diet drinks.  · Focus your calorie counting efforts on higher calorie items. Logging the calories in a garden salad that contains only vegetables is less important than calculating the calories in a milk shake.  · Find a way of tracking calories that works for you. Get creative. Most people who are successful find ways to keep track of how much they eat in a day, even if they do not count every calorie.  WHAT ARE SOME PORTION CONTROL TIPS?  · Know how many calories are in a serving. This will help you know how many servings of a certain food you can have.  · Use a measuring cup to measure serving sizes. This is helpful when you start out. With time, you will be able to estimate serving sizes for some foods.  · Take some time to put servings of different foods on your favorite plates, bowls, and cups so you know what a serving looks like.  · Try not to eat straight from a bag or box. Doing this can lead to overeating. Put the amount you would like to eat in a cup or on a plate to make sure you are eating the right portion.  · Use smaller plates, glasses, and bowls to prevent overeating. This is a quick and easy way to practice portion control. If your plate is smaller, less food can fit on it.  · Try not to multitask while eating, such as watching TV or using your computer. If it is time to eat, sit down at a table and enjoy your food. Doing this will help you to start recognizing when you are full. It will also make you  "more aware of what and how much you are eating.  HOW CAN I CALORIE COUNT WHEN EATING OUT?  · Ask for smaller portion sizes or child-sized portions.  · Consider sharing an entree and sides instead of getting your own entree.  · If you get your own entree, eat only half. Ask for a box at the beginning of your meal and put the rest of your entree in it so you are not tempted to eat it.  · Look for the calories on the menu. If calories are listed, choose the lower calorie options.  · Choose dishes that include vegetables, fruits, whole grains, low-fat dairy products, and lean protein. Focusing on smart food choices from each of the 5 food groups can help you stay on track at restaurants.  · Choose items that are boiled, broiled, grilled, or steamed.  · Choose water, milk, unsweetened iced tea, or other drinks without added sugars. If you want an alcoholic beverage, choose a lower calorie option. For example, a regular cheryl can have up to 700 calories and a glass of wine has around 150.  · Stay away from items that are buttered, battered, fried, or served with cream sauce. Items labeled \"crispy\" are usually fried, unless stated otherwise.  · Ask for dressings, sauces, and syrups on the side. These are usually very high in calories, so do not eat much of them.  · Watch out for salads. Many people think salads are a healthy option, but this is often not the case. Many salads come with booth, fried chicken, lots of cheese, fried chips, and dressing. All of these items have a lot of calories. If you want a salad, choose a garden salad and ask for grilled meats or steak. Ask for the dressing on the side, or ask for olive oil and vinegar or lemon to use as dressing.  · Estimate how many servings of a food you are given. For example, a serving of cooked rice is ½ cup or about the size of half a tennis ball or one cupcake wrapper. Knowing serving sizes will help you be aware of how much food you are eating at restaurants. " The list below tells you how big or small some common portion sizes are based on everyday objects.    1 oz--4 stacked dice.    3 oz--1 deck of cards.    1 tsp--1 dice.    1 Tbsp--½ a Ping-Pong ball.    2 Tbsp--1 Ping-Pong ball.    ½ cup--1 tennis ball or 1 cupcake wrapper.    1 cup--1 baseball.     This information is not intended to replace advice given to you by your health care provider. Make sure you discuss any questions you have with your health care provider.     Document Released: 12/18/2006 Document Revised: 01/08/2016 Document Reviewed: 10/23/2014  [employer] Interactive Patient Education ©2017 [employer]

## 2017-09-17 DIAGNOSIS — M51.36 DEGENERATION OF INTERVERTEBRAL DISC OF LUMBAR REGION: ICD-10-CM

## 2017-09-18 RX ORDER — GABAPENTIN 100 MG/1
CAPSULE ORAL
Qty: 60 CAPSULE | Refills: 3 | OUTPATIENT
Start: 2017-09-18

## 2017-09-19 ENCOUNTER — OFFICE VISIT (OUTPATIENT)
Dept: FAMILY MEDICINE CLINIC | Facility: CLINIC | Age: 57
End: 2017-09-19

## 2017-09-19 VITALS
OXYGEN SATURATION: 100 % | BODY MASS INDEX: 29.65 KG/M2 | HEART RATE: 66 BPM | HEIGHT: 62 IN | TEMPERATURE: 97.7 F | DIASTOLIC BLOOD PRESSURE: 62 MMHG | WEIGHT: 161.12 LBS | SYSTOLIC BLOOD PRESSURE: 152 MMHG

## 2017-09-19 DIAGNOSIS — A28.1 CAT-SCRATCH DISEASE: ICD-10-CM

## 2017-09-19 DIAGNOSIS — Z87.891 FORMER SMOKER: Primary | ICD-10-CM

## 2017-09-19 DIAGNOSIS — H60.391 OTHER INFECTIVE ACUTE OTITIS EXTERNA OF RIGHT EAR: ICD-10-CM

## 2017-09-19 DIAGNOSIS — G89.4 CHRONIC PAIN SYNDROME: ICD-10-CM

## 2017-09-19 DIAGNOSIS — H81.11 BPPV (BENIGN PAROXYSMAL POSITIONAL VERTIGO), RIGHT: ICD-10-CM

## 2017-09-19 DIAGNOSIS — M51.36 DEGENERATION OF INTERVERTEBRAL DISC OF LUMBAR REGION: ICD-10-CM

## 2017-09-19 PROCEDURE — 99214 OFFICE O/P EST MOD 30 MIN: CPT | Performed by: INTERNAL MEDICINE

## 2017-09-19 RX ORDER — AZITHROMYCIN 250 MG/1
TABLET, FILM COATED ORAL
Qty: 6 TABLET | Refills: 0 | Status: SHIPPED | OUTPATIENT
Start: 2017-09-19 | End: 2017-10-27

## 2017-09-19 RX ORDER — FLUTICASONE PROPIONATE 50 MCG
2 SPRAY, SUSPENSION (ML) NASAL DAILY
Qty: 1 BOTTLE | Refills: 0 | Status: SHIPPED | OUTPATIENT
Start: 2017-09-19 | End: 2017-10-27

## 2017-09-19 RX ORDER — CIPROFLOXACIN AND DEXAMETHASONE 3; 1 MG/ML; MG/ML
4 SUSPENSION/ DROPS AURICULAR (OTIC) 2 TIMES DAILY
Qty: 1 EACH | Refills: 0 | Status: SHIPPED | OUTPATIENT
Start: 2017-09-19 | End: 2017-11-29

## 2017-09-19 RX ORDER — MECLIZINE HYDROCHLORIDE 25 MG/1
25 TABLET ORAL 3 TIMES DAILY PRN
Qty: 20 TABLET | Refills: 0 | Status: SHIPPED | OUTPATIENT
Start: 2017-09-19 | End: 2018-05-09 | Stop reason: SDUPTHER

## 2017-09-19 RX ORDER — OXYCODONE AND ACETAMINOPHEN 10; 325 MG/1; MG/1
1 TABLET ORAL EVERY 8 HOURS PRN
Qty: 90 TABLET | Refills: 0 | Status: SHIPPED | OUTPATIENT
Start: 2017-09-19 | End: 2017-10-27 | Stop reason: SDUPTHER

## 2017-09-19 RX ORDER — GABAPENTIN 100 MG/1
100 CAPSULE ORAL 2 TIMES DAILY PRN
Qty: 60 CAPSULE | Refills: 3 | Status: SHIPPED | OUTPATIENT
Start: 2017-09-19 | End: 2017-10-27 | Stop reason: SDUPTHER

## 2017-09-19 NOTE — PROGRESS NOTES
Chief Complaint   Patient presents with   • Follow-up     Patient is here for medicine refill, patient states that she has a cough, dizziness, chest congestion, sore throat, patient states that she has had the symptoms for  almost two weeks  Patient wants a referral for pain clinic       Subjective   Presenting  History of Present Illness   Zina Hameed is a 57 y.o. female  presenting with upper respiratory symptoms.  Patient states that symptoms have been going on for the last 2 weeks.  She notes chills last week, dizziness x 4 days, coughing with productive green mucus.  Cough seems to be improving over the last 2 days.       Patient is requesting pain medication refills.  She is also interested in pain management referral,    Patient mentioned that a kitten Scratched her foot and since then she has had a persistent rash with redness and pinpoint lesions which has not gone away for the last several weeks.      The following portions of the patient's history were reviewed and updated as appropriate: allergies, current medications, past family history, past medical history, past social history, past surgical history and problem list.    Review of Systems   Constitutional: Negative for chills, fatigue and fever.   HENT: Negative for congestion, sinus pressure and sore throat.    Respiratory: Negative for chest tightness and shortness of breath.    Cardiovascular: Negative for chest pain and palpitations.   Gastrointestinal: Negative for abdominal distention, anal bleeding and constipation.   Endocrine: Negative for cold intolerance and polyphagia.   Genitourinary: Negative for difficulty urinating, dysuria and flank pain.   Musculoskeletal: Positive for back pain and gait problem. Negative for arthralgias and myalgias.   Skin: Positive for rash (left foot).   Neurological: Negative for dizziness, tremors, weakness and headaches.   Psychiatric/Behavioral: Negative for agitation and decreased concentration. The  patient is not nervous/anxious.        No Known Allergies    Past Medical History:   Diagnosis Date   • Asthma 2/19/2010   • BMI 30.0-30.9,adult    • BMI 31.0-31.9,adult    • Carpal tunnel syndrome 8/6/2016   • COPD, moderate    • Hyperlipidemia 5/3/2011   • Scoliosis        Social History     Social History   • Marital status:      Spouse name: N/A   • Number of children: N/A   • Years of education: N/A     Occupational History   • Not on file.     Social History Main Topics   • Smoking status: Former Smoker   • Smokeless tobacco: Not on file   • Alcohol use No      Comment: unknown   • Drug use: No   • Sexual activity: Defer     Other Topics Concern   • Not on file     Social History Narrative        Past Surgical History:   Procedure Laterality Date   • ATHERECTOMY      Cath Atherectomy 1 with stent placement   • ILIAC VEIN ANGIOPLASTY / STENTING      PTA Iliac left       Family History   Problem Relation Age of Onset   • Other Mother      Arteriosclerotic cardiovascular disease, cerebrovascular accident   • Hyperlipidemia Mother    • Hypertension Mother    • Kidney disease Mother    • Other Father      Arteriosclerotic cardiovascular disease         Current Outpatient Prescriptions:   •  albuterol (PROVENTIL HFA;VENTOLIN HFA) 108 (90 BASE) MCG/ACT inhaler, Inhale 2 puffs every 4 (four) hours as needed for wheezing or shortness of air., Disp: 1 inhaler, Rfl: 5  •  albuterol (PROVENTIL HFA;VENTOLIN HFA) 108 (90 BASE) MCG/ACT inhaler, Inhale 2 puffs As Needed., Disp: , Rfl:   •  amitriptyline (ELAVIL) 50 MG tablet, Take 1 tablet by mouth Every Night., Disp: 30 tablet, Rfl: 5  •  aspirin 81 MG EC tablet, Take 1 tablet by mouth 4 (four) times a day., Disp: 120 tablet, Rfl: 5  •  EFFIENT 10 MG tablet, take 1 tablet by mouth once daily, Disp: 30 tablet, Rfl: 5  •  fluticasone (FLONASE) 50 MCG/ACT nasal spray, 1 spray into each nostril 2 (two) times a day., Disp: 1 each, Rfl: 5  •  gabapentin (NEURONTIN) 100 MG  capsule, Take 1 capsule by mouth 2 (Two) Times a Day As Needed (pain)., Disp: 60 capsule, Rfl: 3  •  ipratropium-albuterol (DUO-NEB) 0.5-2.5 mg/mL nebulizer, Take 1.5 mL by nebulization every 6 (six) hours as needed for wheezing or shortness of air., Disp: 360 mL, Rfl: 5  •  mometasone-formoterol (DULERA 200) 200-5 MCG/ACT inhaler, Inhale 2 puffs 2 (Two) Times a Day., Disp: 13 g, Rfl: 5  •  Omega-3 Fatty Acids (FISH OIL) 1000 MG capsule capsule, Take 1 capsule by mouth 2 (two) times a day., Disp: , Rfl:   •  ondansetron (ZOFRAN) 4 MG tablet, Take 1 tablet by mouth Every 8 (Eight) Hours As Needed for nausea or vomiting., Disp: 20 tablet, Rfl: 1  •  oxyCODONE-acetaminophen (PERCOCET)  MG per tablet, Take 1 tablet by mouth Every 8 (Eight) Hours As Needed for Moderate Pain ., Disp: 90 tablet, Rfl: 0  •  pantoprazole (PROTONIX) 40 MG EC tablet, take 1 tablet by mouth once daily, Disp: 30 tablet, Rfl: 5  •  propranolol (INDERAL) 20 MG tablet, take 1 tablet by mouth twice a day, Disp: 60 tablet, Rfl: 5  •  raNITIdine (ZANTAC) 150 MG tablet, take 1 tablet by mouth twice a day, Disp: 60 tablet, Rfl: 5  •  simvastatin (ZOCOR) 20 MG tablet, Take 1 tablet by mouth every night., Disp: 30 tablet, Rfl: 5  •  spironolactone (ALDACTONE) 50 MG tablet, take 1 tablet by mouth twice a day, Disp: 60 tablet, Rfl: 5  •  tiotropium (SPIRIVA) 18 MCG per inhalation capsule, Place 1 capsule into inhaler and inhale Daily., Disp: 30 capsule, Rfl: 5  •  azithromycin (ZITHROMAX Z-DEE) 250 MG tablet, Take 2 tablets the first day, then 1 tablet daily for 4 days., Disp: 6 tablet, Rfl: 0  •  ciprofloxacin-dexamethasone (CIPRODEX) 0.3-0.1 % otic suspension, Administer 4 drops to the right ear 2 (Two) Times a Day., Disp: 1 each, Rfl: 0  •  fluticasone (FLONASE) 50 MCG/ACT nasal spray, 2 sprays into each nostril Daily., Disp: 1 bottle, Rfl: 0  •  meclizine (ANTIVERT) 25 MG tablet, Take 1 tablet by mouth 3 (Three) Times a Day As Needed for  "dizziness., Disp: 20 tablet, Rfl: 0    Objective   /62 (BP Location: Right arm, Patient Position: Sitting)  Pulse 66  Temp 97.7 °F (36.5 °C) (Oral)   Ht 62\" (157.5 cm)  Wt 161 lb 1.9 oz (73.1 kg)  SpO2 100%  BMI 29.47 kg/m2    Physical Exam   Constitutional: She is oriented to person, place, and time. She appears well-developed and well-nourished.   HENT:   Head: Normocephalic and atraumatic.   Eyes: Conjunctivae are normal.   Pulmonary/Chest: Effort normal.   Musculoskeletal: Normal range of motion.   Neurological: She is alert and oriented to person, place, and time.   Skin:   Papular red rash on left medial foot   Psychiatric: She has a normal mood and affect. Her behavior is normal.   Nursing note and vitals reviewed.      Assessment/Plan   Zina was seen today for follow-up.    Diagnoses and all orders for this visit:    Former smoker    Degeneration of intervertebral disc of lumbar region  -     gabapentin (NEURONTIN) 100 MG capsule; Take 1 capsule by mouth 2 (Two) Times a Day As Needed (pain).  -     oxyCODONE-acetaminophen (PERCOCET)  MG per tablet; Take 1 tablet by mouth Every 8 (Eight) Hours As Needed for Moderate Pain .    BPPV (benign paroxysmal positional vertigo), right  -     meclizine (ANTIVERT) 25 MG tablet; Take 1 tablet by mouth 3 (Three) Times a Day As Needed for dizziness.  -     fluticasone (FLONASE) 50 MCG/ACT nasal spray; 2 sprays into each nostril Daily.    Other infective acute otitis externa of right ear  -     fluticasone (FLONASE) 50 MCG/ACT nasal spray; 2 sprays into each nostril Daily.  -     ciprofloxacin-dexamethasone (CIPRODEX) 0.3-0.1 % otic suspension; Administer 4 drops to the right ear 2 (Two) Times a Day.    Cat-scratch disease  -     azithromycin (ZITHROMAX Z-DEE) 250 MG tablet; Take 2 tablets the first day, then 1 tablet daily for 4 days.          Discussion Summary:    57-year-old white female presenting with otitis externa of right ear and cat scratch " disease.  Patient will be treated with azithromycin for both of the infections.  She may take Flonase and Ciprodex for her ear symptoms.    Chronic pain  -Gabapentin and Percocet refilled.  Pain management referral placed.  - Patient is aware he/she is being prescribed a high risk controlled substance. A ZION was reviewed and appropriate prior to providing prescriptions for controlled substances.        Follow up:  Return in about 1 month (around 10/19/2017) for Med Refills.     Patient Instructions:  Patient instructions were provided.

## 2017-09-19 NOTE — PATIENT INSTRUCTIONS
Epley Maneuver Self-Care  WHAT IS THE EPLEY MANEUVER?  The Epley maneuver is an exercise you can do to relieve symptoms of benign paroxysmal positional vertigo (BPPV). This condition is often just referred to as vertigo. BPPV is caused by the movement of tiny crystals (canaliths) inside your inner ear. The accumulation and movement of canaliths in your inner ear causes a sudden spinning sensation (vertigo) when you move your head to certain positions. Vertigo usually lasts about 30 seconds. BPPV usually occurs in just one ear. If you get vertigo when you lie on your left side, you probably have BPPV in your left ear. Your health care provider can tell you which ear is involved.   BPPV may be caused by a head injury. Many people older than 50 get BPPV for unknown reasons. If you have been diagnosed with BPPV, your health care provider may teach you how to do this maneuver. BPPV is not life threatening (benign) and usually goes away in time.   WHEN SHOULD I PERFORM THE EPLEY MANEUVER?  You can do this maneuver at home whenever you have symptoms of vertigo. You may do the Epley maneuver up to 3 times a day until your symptoms of vertigo go away.  HOW SHOULD I DO THE EPLEY MANEUVER?  1. Sit on the edge of a bed or table with your back straight. Your legs should be extended or hanging over the edge of the bed or table.    2. Turn your head [facility] toward the affected ear.    3. Lie backward quickly with your head turned until you are lying flat on your back. You may want to position a pillow under your shoulders.    4. Hold this position for 30 seconds. You may experience an attack of vertigo. This is normal. Hold this position until the vertigo stops.  5. Then turn your head to the opposite direction until your unaffected ear is facing the floor.    6. Hold this position for 30 seconds. You may experience an attack of vertigo. This is normal. Hold this position until the vertigo stops.  7. Now turn your whole body to  the same side as your head. Hold for another 30 seconds.    8. You can then sit back up.  ARE THERE RISKS TO THIS MANEUVER?  In some cases, you may have other symptoms (such as changes in your vision, weakness, or numbness). If you have these symptoms, stop doing the maneuver and call your health care provider. Even if doing these maneuvers relieves your vertigo, you may still have dizziness. Dizziness is the sensation of light-headedness but without the sensation of movement. Even though the Epley maneuver may relieve your vertigo, it is possible that your symptoms will return within 5 years.  WHAT SHOULD I DO AFTER THIS MANEUVER?  After doing the Epley maneuver, you can return to your normal activities. Ask your doctor if there is anything you should do at home to prevent vertigo. This may include:  · Sleeping with two or more pillows to keep your head elevated.  · Not sleeping on the side of your affected ear.  · Getting up slowly from bed.  · Avoiding sudden movements during the day.  · Avoiding extreme head movement, like looking up or bending over.  · Wearing a cervical collar to prevent sudden head movements.  WHAT SHOULD I DO IF MY SYMPTOMS GET WORSE?  Call your health care provider if your vertigo gets worse. Call your provider right way if you have other symptoms, including:   · Nausea.  · Vomiting.  · Headache.  · Weakness.  · Numbness.  · Vision changes.     This information is not intended to replace advice given to you by your health care provider. Make sure you discuss any questions you have with your health care provider.     Document Released: 12/23/2014 Document Reviewed: 12/23/2014  [employer] Interactive Patient Education ©2017 [employer]

## 2017-10-27 ENCOUNTER — OFFICE VISIT (OUTPATIENT)
Dept: FAMILY MEDICINE CLINIC | Facility: CLINIC | Age: 57
End: 2017-10-27

## 2017-10-27 DIAGNOSIS — J30.89 ALLERGIC RHINITIS DUE TO OTHER ALLERGIC TRIGGER, UNSPECIFIED CHRONICITY, UNSPECIFIED SEASONALITY: ICD-10-CM

## 2017-10-27 DIAGNOSIS — K29.70 VIRAL GASTRITIS: ICD-10-CM

## 2017-10-27 DIAGNOSIS — I10 ESSENTIAL HYPERTENSION: ICD-10-CM

## 2017-10-27 DIAGNOSIS — Z23 NEED FOR INFLUENZA VACCINATION: ICD-10-CM

## 2017-10-27 DIAGNOSIS — M51.36 DEGENERATION OF INTERVERTEBRAL DISC OF LUMBAR REGION: ICD-10-CM

## 2017-10-27 DIAGNOSIS — G57.93 NEUROPATHY INVOLVING BOTH LOWER EXTREMITIES: Primary | ICD-10-CM

## 2017-10-27 DIAGNOSIS — E78.5 DYSLIPIDEMIA: ICD-10-CM

## 2017-10-27 DIAGNOSIS — I73.9 PERIPHERAL VASCULAR DISEASE (HCC): ICD-10-CM

## 2017-10-27 DIAGNOSIS — J41.0 SIMPLE CHRONIC BRONCHITIS (HCC): ICD-10-CM

## 2017-10-27 DIAGNOSIS — K21.9 GASTROESOPHAGEAL REFLUX DISEASE WITHOUT ESOPHAGITIS: ICD-10-CM

## 2017-10-27 PROCEDURE — 99214 OFFICE O/P EST MOD 30 MIN: CPT | Performed by: INTERNAL MEDICINE

## 2017-10-27 PROCEDURE — 90686 IIV4 VACC NO PRSV 0.5 ML IM: CPT | Performed by: INTERNAL MEDICINE

## 2017-10-27 PROCEDURE — G0008 ADMIN INFLUENZA VIRUS VAC: HCPCS | Performed by: INTERNAL MEDICINE

## 2017-10-27 RX ORDER — PRASUGREL 10 MG/1
10 TABLET, FILM COATED ORAL DAILY
Qty: 30 TABLET | Refills: 5 | Status: SHIPPED | OUTPATIENT
Start: 2017-10-27 | End: 2018-05-09 | Stop reason: SDUPTHER

## 2017-10-27 RX ORDER — SIMVASTATIN 20 MG
20 TABLET ORAL NIGHTLY
Qty: 30 TABLET | Refills: 5 | Status: SHIPPED | OUTPATIENT
Start: 2017-10-27 | End: 2018-05-09 | Stop reason: SDUPTHER

## 2017-10-27 RX ORDER — ONDANSETRON 4 MG/1
4 TABLET, FILM COATED ORAL EVERY 8 HOURS PRN
Qty: 20 TABLET | Refills: 1 | Status: SHIPPED | OUTPATIENT
Start: 2017-10-27 | End: 2018-05-09 | Stop reason: SDUPTHER

## 2017-10-27 RX ORDER — PANTOPRAZOLE SODIUM 40 MG/1
40 TABLET, DELAYED RELEASE ORAL DAILY
Qty: 30 TABLET | Refills: 5 | Status: SHIPPED | OUTPATIENT
Start: 2017-10-27 | End: 2018-05-09 | Stop reason: SDUPTHER

## 2017-10-27 RX ORDER — CHLORAL HYDRATE 500 MG
1 CAPSULE ORAL 2 TIMES DAILY
Qty: 60 EACH | Refills: 5 | Status: SHIPPED | OUTPATIENT
Start: 2017-10-27 | End: 2018-05-09 | Stop reason: SDUPTHER

## 2017-10-27 RX ORDER — IPRATROPIUM BROMIDE AND ALBUTEROL SULFATE 2.5; .5 MG/3ML; MG/3ML
1.5 SOLUTION RESPIRATORY (INHALATION) EVERY 6 HOURS PRN
Qty: 360 ML | Refills: 5 | Status: SHIPPED | OUTPATIENT
Start: 2017-10-27 | End: 2018-02-21 | Stop reason: SDUPTHER

## 2017-10-27 RX ORDER — PROPRANOLOL HYDROCHLORIDE 20 MG/1
20 TABLET ORAL 2 TIMES DAILY
Qty: 60 TABLET | Refills: 5 | Status: SHIPPED | OUTPATIENT
Start: 2017-10-27 | End: 2018-05-09 | Stop reason: SDUPTHER

## 2017-10-27 RX ORDER — SPIRONOLACTONE 50 MG/1
50 TABLET, FILM COATED ORAL DAILY
Qty: 60 TABLET | Refills: 5 | Status: SHIPPED | OUTPATIENT
Start: 2017-10-27 | End: 2018-05-09 | Stop reason: SDUPTHER

## 2017-10-27 RX ORDER — AMITRIPTYLINE HYDROCHLORIDE 50 MG/1
50 TABLET, FILM COATED ORAL NIGHTLY
Qty: 30 TABLET | Refills: 5 | Status: SHIPPED | OUTPATIENT
Start: 2017-10-27 | End: 2018-05-09 | Stop reason: SDUPTHER

## 2017-10-27 RX ORDER — RANITIDINE 150 MG/1
150 TABLET ORAL 2 TIMES DAILY
Qty: 60 TABLET | Refills: 5 | Status: SHIPPED | OUTPATIENT
Start: 2017-10-27 | End: 2018-05-09 | Stop reason: SDUPTHER

## 2017-10-27 RX ORDER — ALBUTEROL SULFATE 90 UG/1
2 AEROSOL, METERED RESPIRATORY (INHALATION) EVERY 4 HOURS PRN
Qty: 1 INHALER | Refills: 5 | Status: SHIPPED | OUTPATIENT
Start: 2017-10-27 | End: 2018-05-09 | Stop reason: SDUPTHER

## 2017-10-27 RX ORDER — ASPIRIN 81 MG/1
81 TABLET ORAL DAILY
Qty: 120 TABLET | Refills: 5 | Status: SHIPPED | OUTPATIENT
Start: 2017-10-27 | End: 2018-05-09 | Stop reason: SDUPTHER

## 2017-10-27 RX ORDER — OXYCODONE AND ACETAMINOPHEN 10; 325 MG/1; MG/1
1 TABLET ORAL EVERY 8 HOURS PRN
Qty: 90 TABLET | Refills: 0 | Status: SHIPPED | OUTPATIENT
Start: 2017-10-27 | End: 2017-11-29 | Stop reason: SDUPTHER

## 2017-10-27 RX ORDER — FLUTICASONE PROPIONATE 50 MCG
1 SPRAY, SUSPENSION (ML) NASAL 2 TIMES DAILY
Qty: 1 BOTTLE | Refills: 2 | Status: SHIPPED | OUTPATIENT
Start: 2017-10-27

## 2017-10-27 RX ORDER — GABAPENTIN 100 MG/1
100 CAPSULE ORAL 2 TIMES DAILY PRN
Qty: 60 CAPSULE | Refills: 3 | Status: SHIPPED | OUTPATIENT
Start: 2017-10-27 | End: 2017-11-29 | Stop reason: SDUPTHER

## 2017-10-27 NOTE — PROGRESS NOTES
Chief Complaint   Patient presents with   • Follow-up     med refills       Subjective     History of Present Illness   Zina Hameed is a 57 y.o. female presenting for follow up on medical problems and chronic pain.  Patient is requesting refills on all medications.  Pain symptoms have been well controlled with the current regimen.  GERD symptoms persist, symptoms triggered by black pepper, spicy food, and acidic juices. She continues to take PPI and H2 blockers regularly since 2012 when she had her bleeding PUD proven by EGD.     The following portions of the patient's history were reviewed and updated as appropriate: allergies, current medications, past family history, past medical history, past social history, past surgical history and problem list.    Review of Systems   Constitutional: Negative for chills, fatigue and fever.   HENT: Negative for congestion, ear pain, rhinorrhea, sinus pressure and sore throat.    Eyes: Negative for visual disturbance.   Respiratory: Negative for cough, chest tightness, shortness of breath and wheezing.    Cardiovascular: Negative for chest pain, palpitations and leg swelling.   Gastrointestinal: Negative for abdominal pain, blood in stool, constipation, diarrhea, nausea and vomiting.   Endocrine: Negative for polydipsia and polyuria.   Genitourinary: Negative for dysuria and hematuria.   Musculoskeletal: Negative for back pain.   Skin: Negative for rash.   Neurological: Negative for dizziness, light-headedness, numbness and headaches.   Psychiatric/Behavioral: Negative for dysphoric mood and sleep disturbance. The patient is not nervous/anxious.        No Known Allergies    Past Medical History:   Diagnosis Date   • Asthma 2/19/2010   • BMI 30.0-30.9,adult    • BMI 31.0-31.9,adult    • Carpal tunnel syndrome 8/6/2016   • COPD, moderate    • Hyperlipidemia 5/3/2011   • Scoliosis        Social History     Social History   • Marital status:      Spouse name: N/A   •  Number of children: N/A   • Years of education: N/A     Occupational History   • Not on file.     Social History Main Topics   • Smoking status: Former Smoker   • Smokeless tobacco: Not on file   • Alcohol use No      Comment: unknown   • Drug use: No   • Sexual activity: Defer     Other Topics Concern   • Not on file     Social History Narrative        Past Surgical History:   Procedure Laterality Date   • ATHERECTOMY      Cath Atherectomy 1 with stent placement   • ILIAC VEIN ANGIOPLASTY / STENTING      PTA Iliac left       Family History   Problem Relation Age of Onset   • Other Mother      Arteriosclerotic cardiovascular disease, cerebrovascular accident   • Hyperlipidemia Mother    • Hypertension Mother    • Kidney disease Mother    • Other Father      Arteriosclerotic cardiovascular disease         Current Outpatient Prescriptions:   •  albuterol (PROVENTIL HFA;VENTOLIN HFA) 108 (90 Base) MCG/ACT inhaler, Inhale 2 puffs Every 4 (Four) Hours As Needed for Wheezing or Shortness of Air., Disp: 1 inhaler, Rfl: 5  •  amitriptyline (ELAVIL) 50 MG tablet, Take 1 tablet by mouth Every Night., Disp: 30 tablet, Rfl: 5  •  aspirin 81 MG EC tablet, Take 1 tablet by mouth Daily., Disp: 120 tablet, Rfl: 5  •  ciprofloxacin-dexamethasone (CIPRODEX) 0.3-0.1 % otic suspension, Administer 4 drops to the right ear 2 (Two) Times a Day., Disp: 1 each, Rfl: 0  •  fluticasone (FLONASE) 50 MCG/ACT nasal spray, 1 spray into each nostril 2 (Two) Times a Day., Disp: 1 bottle, Rfl: 2  •  gabapentin (NEURONTIN) 100 MG capsule, Take 1 capsule by mouth 2 (Two) Times a Day As Needed (pain)., Disp: 60 capsule, Rfl: 3  •  ipratropium-albuterol (DUO-NEB) 0.5-2.5 mg/mL nebulizer, Take 1.5 mL by nebulization Every 6 (Six) Hours As Needed for Wheezing or Shortness of Air., Disp: 360 mL, Rfl: 5  •  meclizine (ANTIVERT) 25 MG tablet, Take 1 tablet by mouth 3 (Three) Times a Day As Needed for dizziness., Disp: 20 tablet, Rfl: 0  •   mometasone-formoterol (DULERA 200) 200-5 MCG/ACT inhaler, Inhale 2 puffs 2 (Two) Times a Day., Disp: 13 g, Rfl: 5  •  Omega-3 Fatty Acids (FISH OIL) 1000 MG capsule capsule, Take 1 capsule by mouth 2 (Two) Times a Day., Disp: 60 each, Rfl: 5  •  ondansetron (ZOFRAN) 4 MG tablet, Take 1 tablet by mouth Every 8 (Eight) Hours As Needed for Nausea or Vomiting., Disp: 20 tablet, Rfl: 1  •  oxyCODONE-acetaminophen (PERCOCET)  MG per tablet, Take 1 tablet by mouth Every 8 (Eight) Hours As Needed for Moderate Pain ., Disp: 90 tablet, Rfl: 0  •  pantoprazole (PROTONIX) 40 MG EC tablet, Take 1 tablet by mouth Daily., Disp: 30 tablet, Rfl: 5  •  prasugrel (EFFIENT) 10 MG tablet, Take 1 tablet by mouth Daily., Disp: 30 tablet, Rfl: 5  •  propranolol (INDERAL) 20 MG tablet, Take 1 tablet by mouth 2 (Two) Times a Day., Disp: 60 tablet, Rfl: 5  •  raNITIdine (ZANTAC) 150 MG tablet, Take 1 tablet by mouth 2 (Two) Times a Day., Disp: 60 tablet, Rfl: 5  •  simvastatin (ZOCOR) 20 MG tablet, Take 1 tablet by mouth Every Night., Disp: 30 tablet, Rfl: 5  •  spironolactone (ALDACTONE) 50 MG tablet, Take 1 tablet by mouth Daily., Disp: 60 tablet, Rfl: 5  •  tiotropium (SPIRIVA) 18 MCG per inhalation capsule, Place 1 capsule into inhaler and inhale Daily., Disp: 30 capsule, Rfl: 5    Objective   There were no vitals taken for this visit.    Physical Exam   Constitutional: She is oriented to person, place, and time. She appears well-developed and well-nourished.   HENT:   Head: Normocephalic and atraumatic.   Eyes: Conjunctivae are normal.   Pulmonary/Chest: Effort normal.   Musculoskeletal: Normal range of motion.   Neurological: She is alert and oriented to person, place, and time.   Psychiatric: She has a normal mood and affect. Her behavior is normal.   Nursing note and vitals reviewed.      Assessment/Plan   Zina was seen today for follow-up.    Diagnoses and all orders for this visit:    Neuropathy involving both lower  extremities    Simple chronic bronchitis  -     albuterol (PROVENTIL HFA;VENTOLIN HFA) 108 (90 Base) MCG/ACT inhaler; Inhale 2 puffs Every 4 (Four) Hours As Needed for Wheezing or Shortness of Air.  -     ipratropium-albuterol (DUO-NEB) 0.5-2.5 mg/mL nebulizer; Take 1.5 mL by nebulization Every 6 (Six) Hours As Needed for Wheezing or Shortness of Air.  -     mometasone-formoterol (DULERA 200) 200-5 MCG/ACT inhaler; Inhale 2 puffs 2 (Two) Times a Day.  -     tiotropium (SPIRIVA) 18 MCG per inhalation capsule; Place 1 capsule into inhaler and inhale Daily.    Degeneration of intervertebral disc of lumbar region  -     amitriptyline (ELAVIL) 50 MG tablet; Take 1 tablet by mouth Every Night.  -     gabapentin (NEURONTIN) 100 MG capsule; Take 1 capsule by mouth 2 (Two) Times a Day As Needed (pain).  -     oxyCODONE-acetaminophen (PERCOCET)  MG per tablet; Take 1 tablet by mouth Every 8 (Eight) Hours As Needed for Moderate Pain .    Peripheral vascular disease  -     aspirin 81 MG EC tablet; Take 1 tablet by mouth Daily.  -     prasugrel (EFFIENT) 10 MG tablet; Take 1 tablet by mouth Daily.    Allergic rhinitis due to other allergic trigger, unspecified chronicity, unspecified seasonality  -     fluticasone (FLONASE) 50 MCG/ACT nasal spray; 1 spray into each nostril 2 (Two) Times a Day.    Dyslipidemia  -     Omega-3 Fatty Acids (FISH OIL) 1000 MG capsule capsule; Take 1 capsule by mouth 2 (Two) Times a Day.  -     simvastatin (ZOCOR) 20 MG tablet; Take 1 tablet by mouth Every Night.    Viral gastritis  -     ondansetron (ZOFRAN) 4 MG tablet; Take 1 tablet by mouth Every 8 (Eight) Hours As Needed for Nausea or Vomiting.    Gastroesophageal reflux disease without esophagitis  -     pantoprazole (PROTONIX) 40 MG EC tablet; Take 1 tablet by mouth Daily.  -     raNITIdine (ZANTAC) 150 MG tablet; Take 1 tablet by mouth 2 (Two) Times a Day.    Essential hypertension  -     propranolol (INDERAL) 20 MG tablet; Take 1 tablet  by mouth 2 (Two) Times a Day.  -     spironolactone (ALDACTONE) 50 MG tablet; Take 1 tablet by mouth Daily.    Need for influenza vaccination  -     Flu Vaccine Quad PF 3YR+ (FLUARIX/FLUZONE 3080-2759)          Discussion Summary:    Chronic pain   - medications were refilled as patient continues to benefit from the medications.   - prior UDS results reviewed and appropriate  - Patient is aware he/she is being prescribed a high risk controlled substance. A ZION was reviewed and appropriate prior to providing prescriptions for controlled substances.    Medications reviewed and refilled for chronic medical issues as listed above.  She was advised to try to wean off protonix if tolerated for chronic GERD.    Follow up:  Return in about 1 month (around 11/27/2017) for Med Refills.     Patient Instructions:  Patient instructions were provided.

## 2017-11-29 ENCOUNTER — OFFICE VISIT (OUTPATIENT)
Dept: FAMILY MEDICINE CLINIC | Facility: CLINIC | Age: 57
End: 2017-11-29

## 2017-11-29 VITALS
HEART RATE: 91 BPM | TEMPERATURE: 98.7 F | DIASTOLIC BLOOD PRESSURE: 76 MMHG | OXYGEN SATURATION: 97 % | HEIGHT: 62 IN | SYSTOLIC BLOOD PRESSURE: 156 MMHG | BODY MASS INDEX: 30.55 KG/M2 | WEIGHT: 166 LBS

## 2017-11-29 DIAGNOSIS — M51.36 DEGENERATION OF INTERVERTEBRAL DISC OF LUMBAR REGION: ICD-10-CM

## 2017-11-29 PROCEDURE — 99214 OFFICE O/P EST MOD 30 MIN: CPT | Performed by: INTERNAL MEDICINE

## 2017-11-29 RX ORDER — GABAPENTIN 100 MG/1
100 CAPSULE ORAL 2 TIMES DAILY PRN
Qty: 60 CAPSULE | Refills: 3 | Status: SHIPPED | OUTPATIENT
Start: 2017-11-29 | End: 2017-12-26 | Stop reason: SDUPTHER

## 2017-11-29 RX ORDER — OXYCODONE AND ACETAMINOPHEN 10; 325 MG/1; MG/1
1 TABLET ORAL EVERY 8 HOURS PRN
Qty: 90 TABLET | Refills: 0 | Status: SHIPPED | OUTPATIENT
Start: 2017-11-29 | End: 2017-12-26 | Stop reason: SDUPTHER

## 2017-11-29 NOTE — PROGRESS NOTES
Chief Complaint   Patient presents with   • Follow-up     med refills       Subjective     History of Present Illness   Zina Hameed is a 57 y.o. female presenting for chronic pain.  Patient is requesting refills.  Pain symptoms have been well controlled with the current regimen.  She is working to get an appointment with pain management.  She needed some help with dates on when she had surgeries for her common iliac artery.       The following portions of the patient's history were reviewed and updated as appropriate: allergies, current medications, past family history, past medical history, past social history, past surgical history and problem list.    Review of Systems   Constitutional: Negative for chills, fatigue and fever.   HENT: Negative for congestion, ear pain, rhinorrhea, sinus pressure and sore throat.    Eyes: Negative for visual disturbance.   Respiratory: Negative for cough, chest tightness, shortness of breath and wheezing.    Cardiovascular: Negative for chest pain, palpitations and leg swelling.   Gastrointestinal: Negative for abdominal pain, blood in stool, constipation, diarrhea, nausea and vomiting.   Endocrine: Negative for polydipsia and polyuria.   Genitourinary: Negative for dysuria and hematuria.   Musculoskeletal: Negative for back pain.   Skin: Negative for rash.   Neurological: Negative for dizziness, light-headedness, numbness and headaches.   Psychiatric/Behavioral: Negative for dysphoric mood and sleep disturbance. The patient is not nervous/anxious.        No Known Allergies    Past Medical History:   Diagnosis Date   • Asthma 2/19/2010   • BMI 30.0-30.9,adult    • BMI 31.0-31.9,adult    • Carpal tunnel syndrome 8/6/2016   • COPD, moderate    • Hyperlipidemia 5/3/2011   • Scoliosis        Social History     Social History   • Marital status:      Spouse name: N/A   • Number of children: N/A   • Years of education: N/A     Occupational History   • Not on file.      Social History Main Topics   • Smoking status: Former Smoker   • Smokeless tobacco: Not on file   • Alcohol use No      Comment: unknown   • Drug use: No   • Sexual activity: Defer     Other Topics Concern   • Not on file     Social History Narrative        Past Surgical History:   Procedure Laterality Date   • ATHERECTOMY      Cath Atherectomy 1 with stent placement   • ILIAC VEIN ANGIOPLASTY / STENTING      PTA Iliac left       Family History   Problem Relation Age of Onset   • Other Mother      Arteriosclerotic cardiovascular disease, cerebrovascular accident   • Hyperlipidemia Mother    • Hypertension Mother    • Kidney disease Mother    • Other Father      Arteriosclerotic cardiovascular disease         Current Outpatient Prescriptions:   •  albuterol (PROVENTIL HFA;VENTOLIN HFA) 108 (90 Base) MCG/ACT inhaler, Inhale 2 puffs Every 4 (Four) Hours As Needed for Wheezing or Shortness of Air., Disp: 1 inhaler, Rfl: 5  •  amitriptyline (ELAVIL) 50 MG tablet, Take 1 tablet by mouth Every Night., Disp: 30 tablet, Rfl: 5  •  aspirin 81 MG EC tablet, Take 1 tablet by mouth Daily., Disp: 120 tablet, Rfl: 5  •  fluticasone (FLONASE) 50 MCG/ACT nasal spray, 1 spray into each nostril 2 (Two) Times a Day., Disp: 1 bottle, Rfl: 2  •  gabapentin (NEURONTIN) 100 MG capsule, Take 1 capsule by mouth 2 (Two) Times a Day As Needed (pain)., Disp: 60 capsule, Rfl: 3  •  ipratropium-albuterol (DUO-NEB) 0.5-2.5 mg/mL nebulizer, Take 1.5 mL by nebulization Every 6 (Six) Hours As Needed for Wheezing or Shortness of Air., Disp: 360 mL, Rfl: 5  •  meclizine (ANTIVERT) 25 MG tablet, Take 1 tablet by mouth 3 (Three) Times a Day As Needed for dizziness., Disp: 20 tablet, Rfl: 0  •  mometasone-formoterol (DULERA 200) 200-5 MCG/ACT inhaler, Inhale 2 puffs 2 (Two) Times a Day., Disp: 13 g, Rfl: 5  •  Omega-3 Fatty Acids (FISH OIL) 1000 MG capsule capsule, Take 1 capsule by mouth 2 (Two) Times a Day., Disp: 60 each, Rfl: 5  •  ondansetron  "(ZOFRAN) 4 MG tablet, Take 1 tablet by mouth Every 8 (Eight) Hours As Needed for Nausea or Vomiting., Disp: 20 tablet, Rfl: 1  •  oxyCODONE-acetaminophen (PERCOCET)  MG per tablet, Take 1 tablet by mouth Every 8 (Eight) Hours As Needed for Moderate Pain ., Disp: 90 tablet, Rfl: 0  •  pantoprazole (PROTONIX) 40 MG EC tablet, Take 1 tablet by mouth Daily., Disp: 30 tablet, Rfl: 5  •  prasugrel (EFFIENT) 10 MG tablet, Take 1 tablet by mouth Daily., Disp: 30 tablet, Rfl: 5  •  propranolol (INDERAL) 20 MG tablet, Take 1 tablet by mouth 2 (Two) Times a Day., Disp: 60 tablet, Rfl: 5  •  raNITIdine (ZANTAC) 150 MG tablet, Take 1 tablet by mouth 2 (Two) Times a Day., Disp: 60 tablet, Rfl: 5  •  simvastatin (ZOCOR) 20 MG tablet, Take 1 tablet by mouth Every Night., Disp: 30 tablet, Rfl: 5  •  spironolactone (ALDACTONE) 50 MG tablet, Take 1 tablet by mouth Daily., Disp: 60 tablet, Rfl: 5  •  tiotropium (SPIRIVA) 18 MCG per inhalation capsule, Place 1 capsule into inhaler and inhale Daily., Disp: 30 capsule, Rfl: 5    Objective   /76  Pulse 91  Temp 98.7 °F (37.1 °C)  Ht 62\" (157.5 cm)  Wt 166 lb (75.3 kg)  SpO2 97%  BMI 30.36 kg/m2    Physical Exam   Constitutional: She is oriented to person, place, and time. She appears well-developed and well-nourished.   HENT:   Head: Normocephalic and atraumatic.   Eyes: Conjunctivae are normal.   Pulmonary/Chest: Effort normal.   Musculoskeletal: Normal range of motion.   Neurological: She is alert and oriented to person, place, and time.   Psychiatric: She has a normal mood and affect. Her behavior is normal.   Nursing note and vitals reviewed.      Assessment/Plan   Zina was seen today for follow-up.    Diagnoses and all orders for this visit:    Degeneration of intervertebral disc of lumbar region  -     oxyCODONE-acetaminophen (PERCOCET)  MG per tablet; Take 1 tablet by mouth Every 8 (Eight) Hours As Needed for Moderate Pain .  -     gabapentin (NEURONTIN) " 100 MG capsule; Take 1 capsule by mouth 2 (Two) Times a Day As Needed (pain).          Discussion Summary:    1. Chronic pain - low back, chronic claudication symptoms.  - medications were refilled as patient continues to benefit from the medications.   - UDS performed, prior UDS results reviewed and appropriate  - Patient is aware he/she is being prescribed a high risk controlled substance. A ZION was reviewed and appropriate prior to providing prescriptions for controlled substances.        Follow up:  No Follow-up on file.     Patient Instructions:  Patient instructions were provided.

## 2017-12-26 ENCOUNTER — OFFICE VISIT (OUTPATIENT)
Dept: FAMILY MEDICINE CLINIC | Facility: CLINIC | Age: 57
End: 2017-12-26

## 2017-12-26 VITALS
WEIGHT: 168 LBS | HEIGHT: 62 IN | SYSTOLIC BLOOD PRESSURE: 148 MMHG | BODY MASS INDEX: 30.91 KG/M2 | OXYGEN SATURATION: 100 % | HEART RATE: 88 BPM | DIASTOLIC BLOOD PRESSURE: 68 MMHG | TEMPERATURE: 97.3 F

## 2017-12-26 DIAGNOSIS — M51.36 DEGENERATION OF INTERVERTEBRAL DISC OF LUMBAR REGION: ICD-10-CM

## 2017-12-26 PROCEDURE — 99214 OFFICE O/P EST MOD 30 MIN: CPT | Performed by: INTERNAL MEDICINE

## 2017-12-26 RX ORDER — OXYCODONE AND ACETAMINOPHEN 10; 325 MG/1; MG/1
1 TABLET ORAL EVERY 8 HOURS PRN
Qty: 90 TABLET | Refills: 0 | Status: SHIPPED | OUTPATIENT
Start: 2017-12-26 | End: 2018-01-26 | Stop reason: SDUPTHER

## 2017-12-26 RX ORDER — GABAPENTIN 100 MG/1
100 CAPSULE ORAL 2 TIMES DAILY PRN
Qty: 60 CAPSULE | Refills: 3 | Status: SHIPPED | OUTPATIENT
Start: 2017-12-26 | End: 2018-01-26 | Stop reason: SDUPTHER

## 2017-12-26 NOTE — PATIENT INSTRUCTIONS
Chronic Back Pain  When back pain lasts longer than 3 months, it is called chronic back pain. The cause of your back pain may not be known. Some common causes include:  · Wear and tear (degenerative disease) of the bones, ligaments, or disks in your back.  · Inflammation and stiffness in your back (arthritis).  People who have chronic back pain often go through certain periods in which the pain is more intense (flare-ups). Many people can learn to manage the pain with home care.  HOME CARE INSTRUCTIONS  Pay attention to any changes in your symptoms. Take these actions to help with your pain:  Activity  · Avoid bending and activities that make the problem worse.  · Do not sit or  one place for long periods of time.  · Take brief periods of rest throughout the day. This will reduce your pain. Resting in a lying or standing position is usually better than sitting to rest.  · When you are resting for longer periods, mix in some mild activity or stretching between periods of rest. This will help to prevent stiffness and pain.  · Get regular exercise. Ask your health care provider what activities are safe for you.  · Do not lift anything that is heavier than 10 lb (4.5 kg). Always use proper lifting technique, which includes:    Bending your knees.    Keeping the load close to your body.    Avoiding twisting.  Managing Pain  · If directed, apply ice to the painful area. Your health care provider may recommend applying ice during the first 24-48 hours after a flare-up begins.    Put ice in a plastic bag.    Place a towel between your skin and the bag.    Leave the ice on for 20 minutes, 2-3 times per day.  · After icing, apply heat to the affected area as often as told by your health care provider. Use the heat source that your health care provider recommends, such as a moist heat pack or a heating pad.    Place a towel between your skin and the heat source.    Leave the heat on for 20-30 minutes.    Remove the  heat if your skin turns bright red. This is especially important if you are unable to feel pain, heat, or cold. You may have a greater risk of getting burned.  · Try soaking in a warm tub.  · Take over-the-counter and prescription medicines only as told by your health care provider.  · Keep all follow-up visits as told by your health care provider. This is important.  SEEK MEDICAL CARE IF:  · You have pain that is not relieved with rest or medicine.  SEEK IMMEDIATE MEDICAL CARE IF:  · You have weakness or numbness in one or both of your legs or feet.  · You have trouble controlling your bladder or your bowels.  · You have nausea or vomiting.  · You have pain in your abdomen.  · You have shortness of breath or you faint.     This information is not intended to replace advice given to you by your health care provider. Make sure you discuss any questions you have with your health care provider.     Document Released: 01/25/2006 Document Revised: 04/10/2017 Document Reviewed: 06/06/2016  [employer] Patient Education ©2017 [employer]

## 2017-12-26 NOTE — PROGRESS NOTES
Chief Complaint   Patient presents with   • Follow-up     med refills; patient has recieved paper work for [facility]; no appointment scheduled yet       Subjective     History of Present Illness   Zina Hameed is a 57 y.o. female presenting for chronic pain.  Patient is requesting refills.  Pain symptoms have been well controlled with the current regimen.      The following portions of the patient's history were reviewed and updated as appropriate: allergies, current medications, past family history, past medical history, past social history, past surgical history and problem list.    Review of Systems   Constitutional: Negative for chills, fatigue and fever.   HENT: Negative for congestion, sinus pressure and sore throat.    Respiratory: Negative for chest tightness and shortness of breath.    Cardiovascular: Negative for chest pain and palpitations.   Gastrointestinal: Negative for abdominal distention, anal bleeding and constipation.   Endocrine: Negative for cold intolerance and polyphagia.   Genitourinary: Negative for difficulty urinating, dysuria and flank pain.   Musculoskeletal: Positive for back pain and gait problem. Negative for arthralgias and myalgias.   Neurological: Negative for dizziness, tremors, weakness and headaches.   Psychiatric/Behavioral: Negative for agitation and decreased concentration. The patient is not nervous/anxious.        No Known Allergies    Past Medical History:   Diagnosis Date   • Asthma 2/19/2010   • BMI 30.0-30.9,adult    • BMI 31.0-31.9,adult    • Carpal tunnel syndrome 8/6/2016   • COPD, moderate    • Hyperlipidemia 5/3/2011   • Scoliosis        Social History     Social History   • Marital status:      Spouse name: N/A   • Number of children: N/A   • Years of education: N/A     Occupational History   • Not on file.     Social History Main Topics   • Smoking status: Former Smoker   • Smokeless tobacco: Not on file   • Alcohol use No      Comment: unknown   • Drug  use: No   • Sexual activity: Defer     Other Topics Concern   • Not on file     Social History Narrative        Past Surgical History:   Procedure Laterality Date   • ATHERECTOMY      Cath Atherectomy 1 with stent placement   • ILIAC VEIN ANGIOPLASTY / STENTING      PTA Iliac left       Family History   Problem Relation Age of Onset   • Other Mother      Arteriosclerotic cardiovascular disease, cerebrovascular accident   • Hyperlipidemia Mother    • Hypertension Mother    • Kidney disease Mother    • Other Father      Arteriosclerotic cardiovascular disease         Current Outpatient Prescriptions:   •  albuterol (PROVENTIL HFA;VENTOLIN HFA) 108 (90 Base) MCG/ACT inhaler, Inhale 2 puffs Every 4 (Four) Hours As Needed for Wheezing or Shortness of Air., Disp: 1 inhaler, Rfl: 5  •  amitriptyline (ELAVIL) 50 MG tablet, Take 1 tablet by mouth Every Night., Disp: 30 tablet, Rfl: 5  •  aspirin 81 MG EC tablet, Take 1 tablet by mouth Daily., Disp: 120 tablet, Rfl: 5  •  fluticasone (FLONASE) 50 MCG/ACT nasal spray, 1 spray into each nostril 2 (Two) Times a Day., Disp: 1 bottle, Rfl: 2  •  gabapentin (NEURONTIN) 100 MG capsule, Take 1 capsule by mouth 2 (Two) Times a Day As Needed (pain)., Disp: 60 capsule, Rfl: 3  •  ipratropium-albuterol (DUO-NEB) 0.5-2.5 mg/mL nebulizer, Take 1.5 mL by nebulization Every 6 (Six) Hours As Needed for Wheezing or Shortness of Air., Disp: 360 mL, Rfl: 5  •  meclizine (ANTIVERT) 25 MG tablet, Take 1 tablet by mouth 3 (Three) Times a Day As Needed for dizziness., Disp: 20 tablet, Rfl: 0  •  mometasone-formoterol (DULERA 200) 200-5 MCG/ACT inhaler, Inhale 2 puffs 2 (Two) Times a Day., Disp: 13 g, Rfl: 5  •  Omega-3 Fatty Acids (FISH OIL) 1000 MG capsule capsule, Take 1 capsule by mouth 2 (Two) Times a Day., Disp: 60 each, Rfl: 5  •  ondansetron (ZOFRAN) 4 MG tablet, Take 1 tablet by mouth Every 8 (Eight) Hours As Needed for Nausea or Vomiting., Disp: 20 tablet, Rfl: 1  •  oxyCODONE-acetaminophen  "(PERCOCET)  MG per tablet, Take 1 tablet by mouth Every 8 (Eight) Hours As Needed for Moderate Pain ., Disp: 90 tablet, Rfl: 0  •  pantoprazole (PROTONIX) 40 MG EC tablet, Take 1 tablet by mouth Daily., Disp: 30 tablet, Rfl: 5  •  prasugrel (EFFIENT) 10 MG tablet, Take 1 tablet by mouth Daily., Disp: 30 tablet, Rfl: 5  •  propranolol (INDERAL) 20 MG tablet, Take 1 tablet by mouth 2 (Two) Times a Day., Disp: 60 tablet, Rfl: 5  •  raNITIdine (ZANTAC) 150 MG tablet, Take 1 tablet by mouth 2 (Two) Times a Day., Disp: 60 tablet, Rfl: 5  •  simvastatin (ZOCOR) 20 MG tablet, Take 1 tablet by mouth Every Night., Disp: 30 tablet, Rfl: 5  •  spironolactone (ALDACTONE) 50 MG tablet, Take 1 tablet by mouth Daily., Disp: 60 tablet, Rfl: 5  •  tiotropium (SPIRIVA) 18 MCG per inhalation capsule, Place 1 capsule into inhaler and inhale Daily., Disp: 30 capsule, Rfl: 5    Objective   /68 (BP Location: Right arm, Patient Position: Sitting)  Pulse 88  Temp 97.3 °F (36.3 °C)  Ht 157.5 cm (62\")  Wt 76.2 kg (168 lb)  SpO2 100%  BMI 30.73 kg/m2    Physical Exam   Constitutional: She is oriented to person, place, and time. She appears well-developed and well-nourished.   HENT:   Head: Normocephalic and atraumatic.   Eyes: Conjunctivae are normal.   Pulmonary/Chest: Effort normal.   Musculoskeletal: Normal range of motion.   Neurological: She is alert and oriented to person, place, and time.   Psychiatric: She has a normal mood and affect. Her behavior is normal.   Nursing note and vitals reviewed.      Assessment/Plan   Zina was seen today for follow-up.    Diagnoses and all orders for this visit:    Degeneration of intervertebral disc of lumbar region  -     gabapentin (NEURONTIN) 100 MG capsule; Take 1 capsule by mouth 2 (Two) Times a Day As Needed (pain).  -     oxyCODONE-acetaminophen (PERCOCET)  MG per tablet; Take 1 tablet by mouth Every 8 (Eight) Hours As Needed for Moderate Pain .          Discussion " Summary:    1. Chronic pain - lumbar spine DJD  - medications were refilled as patient continues to benefit from the medications.   - UDS performed, prior UDS results reviewed and appropriate  - Patient is aware he/she is being prescribed a high risk controlled substance. A ZION was reviewed and appropriate prior to providing prescriptions for controlled substances.  - Pt to complete pain management paperwork for tom: Ilia Boyle.         Follow up:  Return in about 1 month (around 1/26/2018) for Next scheduled follow up.     Patient Instructions:  Patient instructions were provided.

## 2018-01-26 ENCOUNTER — OFFICE VISIT (OUTPATIENT)
Dept: INTERNAL MEDICINE | Facility: CLINIC | Age: 58
End: 2018-01-26

## 2018-01-26 VITALS
BODY MASS INDEX: 30.73 KG/M2 | DIASTOLIC BLOOD PRESSURE: 83 MMHG | OXYGEN SATURATION: 98 % | HEART RATE: 98 BPM | TEMPERATURE: 98 F | SYSTOLIC BLOOD PRESSURE: 139 MMHG | WEIGHT: 167 LBS | HEIGHT: 62 IN

## 2018-01-26 DIAGNOSIS — M51.36 DEGENERATION OF INTERVERTEBRAL DISC OF LUMBAR REGION: ICD-10-CM

## 2018-01-26 PROCEDURE — 99214 OFFICE O/P EST MOD 30 MIN: CPT | Performed by: INTERNAL MEDICINE

## 2018-01-26 RX ORDER — OXYCODONE AND ACETAMINOPHEN 10; 325 MG/1; MG/1
1 TABLET ORAL EVERY 8 HOURS PRN
Qty: 90 TABLET | Refills: 0 | Status: SHIPPED | OUTPATIENT
Start: 2018-01-26 | End: 2018-03-07 | Stop reason: SDUPTHER

## 2018-01-26 RX ORDER — GABAPENTIN 100 MG/1
100 CAPSULE ORAL 2 TIMES DAILY PRN
Qty: 60 CAPSULE | Refills: 3 | Status: SHIPPED | OUTPATIENT
Start: 2018-01-26 | End: 2018-04-10 | Stop reason: SDUPTHER

## 2018-01-26 NOTE — PROGRESS NOTES
Chief Complaint   Patient presents with   • Follow-up     med refills; patient has filled out new patient packet and is waiting for appointment from [facility]       Subjective     History of Present Illness   Zina Hameed is a 57 y.o. female presenting for chronic pain.  Patient is requesting refills.  Pain symptoms have been well controlled with the current regimen.  She has been working with [facility] to work on getting an appointment soon.  Paperwork has been completed.      The following portions of the patient's history were reviewed and updated as appropriate: allergies, current medications, past family history, past medical history, past social history, past surgical history and problem list.    Review of Systems   Constitutional: Negative for chills, fatigue and fever.   HENT: Negative for congestion, ear pain, rhinorrhea, sinus pressure and sore throat.    Eyes: Negative for visual disturbance.   Respiratory: Negative for cough, chest tightness, shortness of breath and wheezing.    Cardiovascular: Negative for chest pain, palpitations and leg swelling.   Gastrointestinal: Negative for abdominal pain, blood in stool, constipation, diarrhea, nausea and vomiting.   Endocrine: Negative for polydipsia and polyuria.   Genitourinary: Negative for dysuria and hematuria.   Musculoskeletal: Positive for back pain.   Skin: Negative for rash.   Neurological: Negative for dizziness, light-headedness, numbness and headaches.   Psychiatric/Behavioral: Negative for dysphoric mood and sleep disturbance. The patient is not nervous/anxious.        No Known Allergies    Past Medical History:   Diagnosis Date   • Asthma 2/19/2010   • BMI 30.0-30.9,adult    • BMI 31.0-31.9,adult    • Carpal tunnel syndrome 8/6/2016   • COPD, moderate    • Hyperlipidemia 5/3/2011   • Scoliosis        Social History     Social History   • Marital status:      Spouse name: N/A   • Number of children: N/A   • Years of education: N/A      Occupational History   • Not on file.     Social History Main Topics   • Smoking status: Former Smoker   • Smokeless tobacco: Not on file   • Alcohol use No      Comment: unknown   • Drug use: No   • Sexual activity: Defer     Other Topics Concern   • Not on file     Social History Narrative        Past Surgical History:   Procedure Laterality Date   • ATHERECTOMY      Cath Atherectomy 1 with stent placement   • ILIAC VEIN ANGIOPLASTY / STENTING      PTA Iliac left       Family History   Problem Relation Age of Onset   • Other Mother      Arteriosclerotic cardiovascular disease, cerebrovascular accident   • Hyperlipidemia Mother    • Hypertension Mother    • Kidney disease Mother    • Other Father      Arteriosclerotic cardiovascular disease         Current Outpatient Prescriptions:   •  albuterol (PROVENTIL HFA;VENTOLIN HFA) 108 (90 Base) MCG/ACT inhaler, Inhale 2 puffs Every 4 (Four) Hours As Needed for Wheezing or Shortness of Air., Disp: 1 inhaler, Rfl: 5  •  amitriptyline (ELAVIL) 50 MG tablet, Take 1 tablet by mouth Every Night., Disp: 30 tablet, Rfl: 5  •  aspirin 81 MG EC tablet, Take 1 tablet by mouth Daily., Disp: 120 tablet, Rfl: 5  •  fluticasone (FLONASE) 50 MCG/ACT nasal spray, 1 spray into each nostril 2 (Two) Times a Day., Disp: 1 bottle, Rfl: 2  •  gabapentin (NEURONTIN) 100 MG capsule, Take 1 capsule by mouth 2 (Two) Times a Day As Needed (pain)., Disp: 60 capsule, Rfl: 3  •  ipratropium-albuterol (DUO-NEB) 0.5-2.5 mg/mL nebulizer, Take 1.5 mL by nebulization Every 6 (Six) Hours As Needed for Wheezing or Shortness of Air., Disp: 360 mL, Rfl: 5  •  meclizine (ANTIVERT) 25 MG tablet, Take 1 tablet by mouth 3 (Three) Times a Day As Needed for dizziness., Disp: 20 tablet, Rfl: 0  •  mometasone-formoterol (DULERA 200) 200-5 MCG/ACT inhaler, Inhale 2 puffs 2 (Two) Times a Day., Disp: 13 g, Rfl: 5  •  Omega-3 Fatty Acids (FISH OIL) 1000 MG capsule capsule, Take 1 capsule by mouth 2 (Two) Times a Day.,  "Disp: 60 each, Rfl: 5  •  ondansetron (ZOFRAN) 4 MG tablet, Take 1 tablet by mouth Every 8 (Eight) Hours As Needed for Nausea or Vomiting., Disp: 20 tablet, Rfl: 1  •  oxyCODONE-acetaminophen (PERCOCET)  MG per tablet, Take 1 tablet by mouth Every 8 (Eight) Hours As Needed for Moderate Pain ., Disp: 90 tablet, Rfl: 0  •  pantoprazole (PROTONIX) 40 MG EC tablet, Take 1 tablet by mouth Daily., Disp: 30 tablet, Rfl: 5  •  prasugrel (EFFIENT) 10 MG tablet, Take 1 tablet by mouth Daily., Disp: 30 tablet, Rfl: 5  •  propranolol (INDERAL) 20 MG tablet, Take 1 tablet by mouth 2 (Two) Times a Day., Disp: 60 tablet, Rfl: 5  •  raNITIdine (ZANTAC) 150 MG tablet, Take 1 tablet by mouth 2 (Two) Times a Day., Disp: 60 tablet, Rfl: 5  •  simvastatin (ZOCOR) 20 MG tablet, Take 1 tablet by mouth Every Night., Disp: 30 tablet, Rfl: 5  •  spironolactone (ALDACTONE) 50 MG tablet, Take 1 tablet by mouth Daily., Disp: 60 tablet, Rfl: 5  •  tiotropium (SPIRIVA) 18 MCG per inhalation capsule, Place 1 capsule into inhaler and inhale Daily., Disp: 30 capsule, Rfl: 5    Objective   /83 (BP Location: Right arm, Patient Position: Sitting)  Pulse 98  Temp 98 °F (36.7 °C)  Ht 157.5 cm (62\")  Wt 75.8 kg (167 lb)  SpO2 98%  BMI 30.54 kg/m2    Physical Exam   Constitutional: She is oriented to person, place, and time. She appears well-developed and well-nourished.   HENT:   Head: Normocephalic and atraumatic.   Eyes: Conjunctivae are normal.   Pulmonary/Chest: Effort normal.   Musculoskeletal: Normal range of motion.   Neurological: She is alert and oriented to person, place, and time.   Psychiatric: She has a normal mood and affect. Her behavior is normal.   Nursing note and vitals reviewed.      Assessment/Plan   Zina was seen today for follow-up.    Diagnoses and all orders for this visit:    Degeneration of intervertebral disc of lumbar region  -     oxyCODONE-acetaminophen (PERCOCET)  MG per tablet; Take 1 tablet by " mouth Every 8 (Eight) Hours As Needed for Moderate Pain .  -     gabapentin (NEURONTIN) 100 MG capsule; Take 1 capsule by mouth 2 (Two) Times a Day As Needed (pain).          Discussion Summary:    1. Chronic pain - DJD with radiculopathy.   - medications were refilled as patient continues to benefit from the medications.   - UDS UTD, prior UDS results reviewed and appropriate  - Patient is aware he/she is being prescribed a high risk controlled substance. A ZION was reviewed and appropriate prior to providing prescriptions for controlled substances.    Will need to check Preventive health at next visit. Pt will present fasting.     Follow up:  No Follow-up on file.     Patient Instructions:  Patient instructions were provided.

## 2018-02-21 DIAGNOSIS — J41.0 SIMPLE CHRONIC BRONCHITIS (HCC): ICD-10-CM

## 2018-02-21 RX ORDER — IPRATROPIUM BROMIDE AND ALBUTEROL SULFATE 2.5; .5 MG/3ML; MG/3ML
1.5 SOLUTION RESPIRATORY (INHALATION) EVERY 6 HOURS PRN
Qty: 360 ML | Refills: 5 | Status: SHIPPED | OUTPATIENT
Start: 2018-02-21 | End: 2019-03-05 | Stop reason: SDUPTHER

## 2018-03-07 ENCOUNTER — OFFICE VISIT (OUTPATIENT)
Dept: INTERNAL MEDICINE | Facility: CLINIC | Age: 58
End: 2018-03-07

## 2018-03-07 VITALS
DIASTOLIC BLOOD PRESSURE: 52 MMHG | TEMPERATURE: 97.5 F | HEIGHT: 62 IN | WEIGHT: 160 LBS | SYSTOLIC BLOOD PRESSURE: 118 MMHG | OXYGEN SATURATION: 99 % | BODY MASS INDEX: 29.44 KG/M2 | HEART RATE: 84 BPM

## 2018-03-07 DIAGNOSIS — Z79.899 ENCOUNTER FOR LONG-TERM CURRENT USE OF MEDICATION: ICD-10-CM

## 2018-03-07 DIAGNOSIS — K59.09 CHRONIC CONSTIPATION: ICD-10-CM

## 2018-03-07 DIAGNOSIS — K21.9 GASTROESOPHAGEAL REFLUX DISEASE WITHOUT ESOPHAGITIS: Primary | ICD-10-CM

## 2018-03-07 DIAGNOSIS — M51.36 DEGENERATION OF INTERVERTEBRAL DISC OF LUMBAR REGION: ICD-10-CM

## 2018-03-07 PROCEDURE — 99214 OFFICE O/P EST MOD 30 MIN: CPT | Performed by: INTERNAL MEDICINE

## 2018-03-07 RX ORDER — OXYCODONE AND ACETAMINOPHEN 10; 325 MG/1; MG/1
1 TABLET ORAL EVERY 8 HOURS PRN
Qty: 90 TABLET | Refills: 0 | Status: SHIPPED | OUTPATIENT
Start: 2018-03-07 | End: 2018-04-06 | Stop reason: SDUPTHER

## 2018-03-07 NOTE — PROGRESS NOTES
Chief Complaint   Patient presents with   • Abdominal Pain     patient has been taking protonix every other day per last visit instructions   • Follow-up     med refills; patient has completed paperwork for PTC but has not been contacted with an appointment        Subjective     History of Present Illness   Zina Hameed is a 57 y.o. female presenting for chronic pain.  Patient is requesting refills.  Pain symptoms have been well controlled with the current regimen.  She missed her last appointment as she did not have a ride to come in for medication refills.  GERD symptoms have worsened since trying to taper down on PPI.  She would like to restart her medications.     The following portions of the patient's history were reviewed and updated as appropriate: allergies, current medications, past family history, past medical history, past social history, past surgical history and problem list.    Review of Systems   Constitutional: Negative for chills, fatigue and fever.   HENT: Negative for congestion, ear pain, rhinorrhea, sinus pressure and sore throat.    Eyes: Negative for visual disturbance.   Respiratory: Negative for cough, chest tightness, shortness of breath and wheezing.    Cardiovascular: Negative for chest pain, palpitations and leg swelling.   Gastrointestinal: Positive for abdominal pain and nausea. Negative for blood in stool, constipation, diarrhea and vomiting.   Endocrine: Negative for polydipsia and polyuria.   Genitourinary: Negative for dysuria and hematuria.   Musculoskeletal: Negative for back pain.   Skin: Negative for rash.   Neurological: Negative for dizziness, light-headedness, numbness and headaches.   Psychiatric/Behavioral: Negative for dysphoric mood and sleep disturbance. The patient is not nervous/anxious.        No Known Allergies    Past Medical History:   Diagnosis Date   • Asthma 2/19/2010   • BMI 30.0-30.9,adult    • BMI 31.0-31.9,adult    • Carpal tunnel syndrome 8/6/2016    • COPD, moderate    • Hyperlipidemia 5/3/2011   • Scoliosis        Social History     Social History   • Marital status:      Spouse name: N/A   • Number of children: N/A   • Years of education: N/A     Occupational History   • Not on file.     Social History Main Topics   • Smoking status: Former Smoker   • Smokeless tobacco: Not on file   • Alcohol use No      Comment: unknown   • Drug use: No   • Sexual activity: Defer     Other Topics Concern   • Not on file     Social History Narrative        Past Surgical History:   Procedure Laterality Date   • ATHERECTOMY      Cath Atherectomy 1 with stent placement   • ILIAC VEIN ANGIOPLASTY / STENTING      PTA Iliac left       Family History   Problem Relation Age of Onset   • Other Mother      Arteriosclerotic cardiovascular disease, cerebrovascular accident   • Hyperlipidemia Mother    • Hypertension Mother    • Kidney disease Mother    • Other Father      Arteriosclerotic cardiovascular disease         Current Outpatient Prescriptions:   •  albuterol (PROVENTIL HFA;VENTOLIN HFA) 108 (90 Base) MCG/ACT inhaler, Inhale 2 puffs Every 4 (Four) Hours As Needed for Wheezing or Shortness of Air., Disp: 1 inhaler, Rfl: 5  •  amitriptyline (ELAVIL) 50 MG tablet, Take 1 tablet by mouth Every Night., Disp: 30 tablet, Rfl: 5  •  aspirin 81 MG EC tablet, Take 1 tablet by mouth Daily., Disp: 120 tablet, Rfl: 5  •  fluticasone (FLONASE) 50 MCG/ACT nasal spray, 1 spray into each nostril 2 (Two) Times a Day., Disp: 1 bottle, Rfl: 2  •  gabapentin (NEURONTIN) 100 MG capsule, Take 1 capsule by mouth 2 (Two) Times a Day As Needed (pain)., Disp: 60 capsule, Rfl: 3  •  ipratropium-albuterol (DUO-NEB) 0.5-2.5 mg/mL nebulizer, Take 1.5 mL by nebulization Every 6 (Six) Hours As Needed for Wheezing or Shortness of Air., Disp: 360 mL, Rfl: 5  •  meclizine (ANTIVERT) 25 MG tablet, Take 1 tablet by mouth 3 (Three) Times a Day As Needed for dizziness., Disp: 20 tablet, Rfl: 0  •   "mometasone-formoterol (DULERA 200) 200-5 MCG/ACT inhaler, Inhale 2 puffs 2 (Two) Times a Day., Disp: 13 g, Rfl: 5  •  Omega-3 Fatty Acids (FISH OIL) 1000 MG capsule capsule, Take 1 capsule by mouth 2 (Two) Times a Day., Disp: 60 each, Rfl: 5  •  ondansetron (ZOFRAN) 4 MG tablet, Take 1 tablet by mouth Every 8 (Eight) Hours As Needed for Nausea or Vomiting., Disp: 20 tablet, Rfl: 1  •  oxyCODONE-acetaminophen (PERCOCET)  MG per tablet, Take 1 tablet by mouth Every 8 (Eight) Hours As Needed for Moderate Pain ., Disp: 90 tablet, Rfl: 0  •  pantoprazole (PROTONIX) 40 MG EC tablet, Take 1 tablet by mouth Daily., Disp: 30 tablet, Rfl: 5  •  prasugrel (EFFIENT) 10 MG tablet, Take 1 tablet by mouth Daily., Disp: 30 tablet, Rfl: 5  •  propranolol (INDERAL) 20 MG tablet, Take 1 tablet by mouth 2 (Two) Times a Day., Disp: 60 tablet, Rfl: 5  •  raNITIdine (ZANTAC) 150 MG tablet, Take 1 tablet by mouth 2 (Two) Times a Day., Disp: 60 tablet, Rfl: 5  •  simvastatin (ZOCOR) 20 MG tablet, Take 1 tablet by mouth Every Night., Disp: 30 tablet, Rfl: 5  •  spironolactone (ALDACTONE) 50 MG tablet, Take 1 tablet by mouth Daily., Disp: 60 tablet, Rfl: 5  •  tiotropium (SPIRIVA) 18 MCG per inhalation capsule, Place 1 capsule into inhaler and inhale Daily., Disp: 30 capsule, Rfl: 5    Objective   /52 (BP Location: Left arm, Patient Position: Sitting)  Pulse 84  Temp 97.5 °F (36.4 °C)  Ht 157.5 cm (62\")  Wt 72.6 kg (160 lb)  SpO2 99%  BMI 29.26 kg/m2    Physical Exam   Constitutional: She is oriented to person, place, and time. She appears well-developed and well-nourished.   HENT:   Head: Normocephalic and atraumatic.   Eyes: Conjunctivae are normal.   Pulmonary/Chest: Effort normal.   Musculoskeletal: Normal range of motion.   Neurological: She is alert and oriented to person, place, and time.   Psychiatric: She has a normal mood and affect. Her behavior is normal.   Nursing note and vitals reviewed.      Assessment/Plan "   Zina was seen today for abdominal pain and follow-up.    Diagnoses and all orders for this visit:    Gastroesophageal reflux disease without esophagitis    Degeneration of intervertebral disc of lumbar region  -     oxyCODONE-acetaminophen (PERCOCET)  MG per tablet; Take 1 tablet by mouth Every 8 (Eight) Hours As Needed for Moderate Pain .    Chronic constipation          Discussion Summary:    1. Chronic pain   - medications were refilled as patient continues to benefit from the medications.   - UDS performed today, prior UDS results reviewed and appropriate  - Patient is aware he/she is being prescribed a high risk controlled substance. A ZION was reviewed and appropriate prior to providing prescriptions for controlled substances.  - Has completed paperwork with [facility] in [place], currently awaiting appointment.     2. GERD / Chronic Nausea  - will try to taper off zantac and continue Protonix QD.     Follow up:  No Follow-up on file.     Patient Instructions:  Patient instructions were provided.

## 2018-04-06 ENCOUNTER — OFFICE VISIT (OUTPATIENT)
Dept: INTERNAL MEDICINE | Facility: CLINIC | Age: 58
End: 2018-04-06

## 2018-04-06 VITALS
WEIGHT: 159 LBS | TEMPERATURE: 97.5 F | HEART RATE: 90 BPM | SYSTOLIC BLOOD PRESSURE: 136 MMHG | DIASTOLIC BLOOD PRESSURE: 72 MMHG | BODY MASS INDEX: 29.26 KG/M2 | HEIGHT: 62 IN | OXYGEN SATURATION: 95 %

## 2018-04-06 DIAGNOSIS — J41.0 SIMPLE CHRONIC BRONCHITIS (HCC): ICD-10-CM

## 2018-04-06 DIAGNOSIS — J44.1 COPD WITH ACUTE EXACERBATION (HCC): ICD-10-CM

## 2018-04-06 DIAGNOSIS — G89.4 CHRONIC PAIN SYNDROME: Primary | ICD-10-CM

## 2018-04-06 DIAGNOSIS — M51.36 DEGENERATION OF INTERVERTEBRAL DISC OF LUMBAR REGION: ICD-10-CM

## 2018-04-06 PROCEDURE — 99214 OFFICE O/P EST MOD 30 MIN: CPT | Performed by: INTERNAL MEDICINE

## 2018-04-06 RX ORDER — AZITHROMYCIN 250 MG/1
TABLET, FILM COATED ORAL
Qty: 6 TABLET | Refills: 0 | Status: SHIPPED | OUTPATIENT
Start: 2018-04-06 | End: 2018-05-09

## 2018-04-06 RX ORDER — PREDNISONE 1 MG/1
TABLET ORAL
Qty: 21 TABLET | Refills: 0 | Status: SHIPPED | OUTPATIENT
Start: 2018-04-06 | End: 2018-05-09

## 2018-04-06 RX ORDER — OXYCODONE AND ACETAMINOPHEN 10; 325 MG/1; MG/1
1 TABLET ORAL EVERY 8 HOURS PRN
Qty: 90 TABLET | Refills: 0 | Status: SHIPPED | OUTPATIENT
Start: 2018-04-06 | End: 2018-05-09 | Stop reason: SDUPTHER

## 2018-04-06 NOTE — PROGRESS NOTES
Chief Complaint   Patient presents with   • Cough     congestion, greenish sputum, burning in chest, right ear pain, and sore throat   • Pain     has not been given an appointment from pain clinic; med refills       Subjective     History of Present Illness   Zina Hameed is a 58 y.o. female presenting for follow up.  She complains of URI symptoms since Saturday which began with dry cough and progressed to sore throat, productive green sputum, and right ear pain.  Also has some associated SOA worse with exertion.     The following portions of the patient's history were reviewed and updated as appropriate: allergies, current medications, past family history, past medical history, past social history, past surgical history and problem list.    Review of Systems   Constitutional: Negative for chills, fatigue and fever.   HENT: Negative for congestion, ear pain, rhinorrhea, sinus pressure and sore throat.    Eyes: Negative for visual disturbance.   Respiratory: Negative for cough, chest tightness, shortness of breath and wheezing.    Cardiovascular: Negative for chest pain, palpitations and leg swelling.   Gastrointestinal: Negative for abdominal pain, blood in stool, constipation, diarrhea, nausea and vomiting.   Endocrine: Negative for polydipsia and polyuria.   Genitourinary: Negative for dysuria and hematuria.   Musculoskeletal: Positive for arthralgias and back pain.   Skin: Negative for rash.   Neurological: Negative for dizziness, light-headedness, numbness and headaches.   Psychiatric/Behavioral: Negative for dysphoric mood and sleep disturbance. The patient is not nervous/anxious.        No Known Allergies    Past Medical History:   Diagnosis Date   • Asthma 2/19/2010   • BMI 30.0-30.9,adult    • BMI 31.0-31.9,adult    • Carpal tunnel syndrome 8/6/2016   • COPD, moderate    • Hyperlipidemia 5/3/2011   • Scoliosis        Social History     Social History   • Marital status:      Spouse name: N/A   •  Number of children: N/A   • Years of education: N/A     Occupational History   • Not on file.     Social History Main Topics   • Smoking status: Former Smoker   • Smokeless tobacco: Not on file   • Alcohol use No      Comment: unknown   • Drug use: No   • Sexual activity: Defer     Other Topics Concern   • Not on file     Social History Narrative   • No narrative on file        Past Surgical History:   Procedure Laterality Date   • ATHERECTOMY      Cath Atherectomy 1 with stent placement   • ILIAC VEIN ANGIOPLASTY / STENTING      PTA Iliac left       Family History   Problem Relation Age of Onset   • Other Mother      Arteriosclerotic cardiovascular disease, cerebrovascular accident   • Hyperlipidemia Mother    • Hypertension Mother    • Kidney disease Mother    • Other Father      Arteriosclerotic cardiovascular disease         Current Outpatient Prescriptions:   •  albuterol (PROVENTIL HFA;VENTOLIN HFA) 108 (90 Base) MCG/ACT inhaler, Inhale 2 puffs Every 4 (Four) Hours As Needed for Wheezing or Shortness of Air., Disp: 1 inhaler, Rfl: 5  •  amitriptyline (ELAVIL) 50 MG tablet, Take 1 tablet by mouth Every Night., Disp: 30 tablet, Rfl: 5  •  aspirin 81 MG EC tablet, Take 1 tablet by mouth Daily., Disp: 120 tablet, Rfl: 5  •  fluticasone (FLONASE) 50 MCG/ACT nasal spray, 1 spray into each nostril 2 (Two) Times a Day., Disp: 1 bottle, Rfl: 2  •  gabapentin (NEURONTIN) 100 MG capsule, Take 1 capsule by mouth 2 (Two) Times a Day As Needed (pain)., Disp: 60 capsule, Rfl: 3  •  ipratropium-albuterol (DUO-NEB) 0.5-2.5 mg/mL nebulizer, Take 1.5 mL by nebulization Every 6 (Six) Hours As Needed for Wheezing or Shortness of Air., Disp: 360 mL, Rfl: 5  •  meclizine (ANTIVERT) 25 MG tablet, Take 1 tablet by mouth 3 (Three) Times a Day As Needed for dizziness., Disp: 20 tablet, Rfl: 0  •  mometasone-formoterol (DULERA 200) 200-5 MCG/ACT inhaler, Inhale 2 puffs 2 (Two) Times a Day., Disp: 13 g, Rfl: 5  •  Omega-3 Fatty Acids  "(FISH OIL) 1000 MG capsule capsule, Take 1 capsule by mouth 2 (Two) Times a Day., Disp: 60 each, Rfl: 5  •  ondansetron (ZOFRAN) 4 MG tablet, Take 1 tablet by mouth Every 8 (Eight) Hours As Needed for Nausea or Vomiting., Disp: 20 tablet, Rfl: 1  •  oxyCODONE-acetaminophen (PERCOCET)  MG per tablet, Take 1 tablet by mouth Every 8 (Eight) Hours As Needed for Moderate Pain ., Disp: 90 tablet, Rfl: 0  •  pantoprazole (PROTONIX) 40 MG EC tablet, Take 1 tablet by mouth Daily., Disp: 30 tablet, Rfl: 5  •  prasugrel (EFFIENT) 10 MG tablet, Take 1 tablet by mouth Daily., Disp: 30 tablet, Rfl: 5  •  propranolol (INDERAL) 20 MG tablet, Take 1 tablet by mouth 2 (Two) Times a Day., Disp: 60 tablet, Rfl: 5  •  raNITIdine (ZANTAC) 150 MG tablet, Take 1 tablet by mouth 2 (Two) Times a Day., Disp: 60 tablet, Rfl: 5  •  simvastatin (ZOCOR) 20 MG tablet, Take 1 tablet by mouth Every Night., Disp: 30 tablet, Rfl: 5  •  spironolactone (ALDACTONE) 50 MG tablet, Take 1 tablet by mouth Daily., Disp: 60 tablet, Rfl: 5  •  tiotropium (SPIRIVA) 18 MCG per inhalation capsule, Place 1 capsule into inhaler and inhale Daily., Disp: 30 capsule, Rfl: 5  •  azithromycin (ZITHROMAX Z-DEE) 250 MG tablet, Take 2 tablets the first day, then 1 tablet daily for 4 days., Disp: 6 tablet, Rfl: 0  •  predniSONE (DELTASONE) 5 MG tablet, Prednisone taper, take as directed., Disp: 21 tablet, Rfl: 0    Objective   /72 (BP Location: Left arm, Patient Position: Sitting)   Pulse 90   Temp 97.5 °F (36.4 °C)   Ht 157.5 cm (62\")   Wt 72.1 kg (159 lb)   SpO2 95%   BMI 29.08 kg/m²     Physical Exam   Constitutional: She is oriented to person, place, and time. She appears well-developed and well-nourished.   HENT:   Head: Normocephalic and atraumatic.   Eyes: Conjunctivae are normal.   Cardiovascular: Normal rate, regular rhythm and normal heart sounds.    Pulmonary/Chest: Effort normal. No respiratory distress. She has wheezes.   Musculoskeletal: " Normal range of motion.   Neurological: She is alert and oriented to person, place, and time.   Psychiatric: She has a normal mood and affect. Her behavior is normal.   Nursing note and vitals reviewed.      Assessment/Plan   Zina was seen today for cough and pain.    Diagnoses and all orders for this visit:    Chronic pain syndrome  -     Ambulatory Referral to Pain Management    Degeneration of intervertebral disc of lumbar region  -     oxyCODONE-acetaminophen (PERCOCET)  MG per tablet; Take 1 tablet by mouth Every 8 (Eight) Hours As Needed for Moderate Pain .  -     Ambulatory Referral to Pain Management    Simple chronic bronchitis  -     tiotropium (SPIRIVA) 18 MCG per inhalation capsule; Place 1 capsule into inhaler and inhale Daily.  -     mometasone-formoterol (DULERA 200) 200-5 MCG/ACT inhaler; Inhale 2 puffs 2 (Two) Times a Day.    COPD with acute exacerbation  -     azithromycin (ZITHROMAX Z-DEE) 250 MG tablet; Take 2 tablets the first day, then 1 tablet daily for 4 days.  -     predniSONE (DELTASONE) 5 MG tablet; Prednisone taper, take as directed.          Discussion Summary:    58-year-old white female presenting for follow-up on chronic medical issues    1.  COPD with exacerbation  -Start antibiotics and steroids.    2.  Chronic pain  -Percocet refilled.  Patient needs to follow-up with pain management.  Referral has been placed.  The patient has read and signed the [facility] Controlled Substance Contract.  I will continue to see patient for regular follow up appointments.  They are well controlled on their medication.  ZION has been reviewed by me and is updated every 3 months. The patient is aware of the potential for addiction and dependence.    3. COPD - meds refilled.         Follow up:  No Follow-up on file.     Patient Instructions:  Patient instructions were provided.

## 2018-04-10 DIAGNOSIS — M51.36 DEGENERATION OF INTERVERTEBRAL DISC OF LUMBAR REGION: ICD-10-CM

## 2018-04-11 RX ORDER — GABAPENTIN 100 MG/1
100 CAPSULE ORAL 2 TIMES DAILY PRN
Qty: 60 CAPSULE | Refills: 0 | Status: SHIPPED | OUTPATIENT
Start: 2018-04-11 | End: 2018-05-09 | Stop reason: SDUPTHER

## 2018-05-09 ENCOUNTER — OFFICE VISIT (OUTPATIENT)
Dept: INTERNAL MEDICINE | Facility: CLINIC | Age: 58
End: 2018-05-09

## 2018-05-09 VITALS
DIASTOLIC BLOOD PRESSURE: 76 MMHG | TEMPERATURE: 97.2 F | WEIGHT: 158 LBS | HEART RATE: 93 BPM | HEIGHT: 62 IN | SYSTOLIC BLOOD PRESSURE: 152 MMHG | BODY MASS INDEX: 29.08 KG/M2 | OXYGEN SATURATION: 98 %

## 2018-05-09 DIAGNOSIS — I73.9 PERIPHERAL VASCULAR DISEASE (HCC): ICD-10-CM

## 2018-05-09 DIAGNOSIS — K21.9 GASTROESOPHAGEAL REFLUX DISEASE WITHOUT ESOPHAGITIS: ICD-10-CM

## 2018-05-09 DIAGNOSIS — H81.11 BPPV (BENIGN PAROXYSMAL POSITIONAL VERTIGO), RIGHT: ICD-10-CM

## 2018-05-09 DIAGNOSIS — E78.5 DYSLIPIDEMIA: ICD-10-CM

## 2018-05-09 DIAGNOSIS — I10 ESSENTIAL HYPERTENSION: ICD-10-CM

## 2018-05-09 DIAGNOSIS — J41.0 SIMPLE CHRONIC BRONCHITIS (HCC): ICD-10-CM

## 2018-05-09 DIAGNOSIS — M51.36 DEGENERATION OF INTERVERTEBRAL DISC OF LUMBAR REGION: ICD-10-CM

## 2018-05-09 DIAGNOSIS — K29.70 VIRAL GASTRITIS: ICD-10-CM

## 2018-05-09 DIAGNOSIS — J44.1 COPD WITH ACUTE EXACERBATION (HCC): ICD-10-CM

## 2018-05-09 DIAGNOSIS — M54.41 ACUTE RIGHT-SIDED LOW BACK PAIN WITH RIGHT-SIDED SCIATICA: Primary | ICD-10-CM

## 2018-05-09 PROCEDURE — 96372 THER/PROPH/DIAG INJ SC/IM: CPT | Performed by: INTERNAL MEDICINE

## 2018-05-09 PROCEDURE — 99214 OFFICE O/P EST MOD 30 MIN: CPT | Performed by: INTERNAL MEDICINE

## 2018-05-09 RX ORDER — RANITIDINE 150 MG/1
150 TABLET ORAL NIGHTLY
Qty: 60 TABLET | Refills: 5 | Status: SHIPPED | OUTPATIENT
Start: 2018-05-09 | End: 2018-05-10 | Stop reason: SDUPTHER

## 2018-05-09 RX ORDER — SIMVASTATIN 20 MG
20 TABLET ORAL NIGHTLY
Qty: 30 TABLET | Refills: 5 | Status: SHIPPED | OUTPATIENT
Start: 2018-05-09 | End: 2018-05-10 | Stop reason: SDUPTHER

## 2018-05-09 RX ORDER — MECLIZINE HYDROCHLORIDE 25 MG/1
25 TABLET ORAL 3 TIMES DAILY PRN
Qty: 20 TABLET | Refills: 0 | Status: SHIPPED | OUTPATIENT
Start: 2018-05-09 | End: 2019-03-05 | Stop reason: SDUPTHER

## 2018-05-09 RX ORDER — METHYLPREDNISOLONE ACETATE 80 MG/ML
80 INJECTION, SUSPENSION INTRA-ARTICULAR; INTRALESIONAL; INTRAMUSCULAR; SOFT TISSUE ONCE
Status: COMPLETED | OUTPATIENT
Start: 2018-05-09 | End: 2018-05-09

## 2018-05-09 RX ORDER — OXYCODONE AND ACETAMINOPHEN 10; 325 MG/1; MG/1
1 TABLET ORAL EVERY 8 HOURS PRN
Qty: 90 TABLET | Refills: 0 | Status: SHIPPED | OUTPATIENT
Start: 2018-05-09 | End: 2019-12-20

## 2018-05-09 RX ORDER — PROPRANOLOL HYDROCHLORIDE 20 MG/1
20 TABLET ORAL 2 TIMES DAILY
Qty: 60 TABLET | Refills: 5 | Status: SHIPPED | OUTPATIENT
Start: 2018-05-09 | End: 2018-05-10 | Stop reason: SDUPTHER

## 2018-05-09 RX ORDER — SPIRONOLACTONE 50 MG/1
50 TABLET, FILM COATED ORAL DAILY
Qty: 60 TABLET | Refills: 5 | Status: SHIPPED | OUTPATIENT
Start: 2018-05-09 | End: 2018-11-07 | Stop reason: SDUPTHER

## 2018-05-09 RX ORDER — ASPIRIN 81 MG/1
81 TABLET ORAL DAILY
Qty: 120 TABLET | Refills: 5 | Status: SHIPPED | OUTPATIENT
Start: 2018-05-09 | End: 2019-03-05 | Stop reason: SDUPTHER

## 2018-05-09 RX ORDER — CHLORAL HYDRATE 500 MG
1 CAPSULE ORAL 2 TIMES DAILY WITH MEALS
Qty: 60 EACH | Refills: 5 | Status: SHIPPED | OUTPATIENT
Start: 2018-05-09 | End: 2020-06-23 | Stop reason: SDUPTHER

## 2018-05-09 RX ORDER — AMITRIPTYLINE HYDROCHLORIDE 50 MG/1
50 TABLET, FILM COATED ORAL NIGHTLY
Qty: 30 TABLET | Refills: 5 | Status: SHIPPED | OUTPATIENT
Start: 2018-05-09 | End: 2018-11-30 | Stop reason: SDUPTHER

## 2018-05-09 RX ORDER — ONDANSETRON 4 MG/1
4 TABLET, FILM COATED ORAL EVERY 8 HOURS PRN
Qty: 20 TABLET | Refills: 1 | Status: SHIPPED | OUTPATIENT
Start: 2018-05-09 | End: 2019-03-05 | Stop reason: SDUPTHER

## 2018-05-09 RX ORDER — PANTOPRAZOLE SODIUM 40 MG/1
40 TABLET, DELAYED RELEASE ORAL DAILY
Qty: 30 TABLET | Refills: 5 | Status: SHIPPED | OUTPATIENT
Start: 2018-05-09 | End: 2018-05-10 | Stop reason: SDUPTHER

## 2018-05-09 RX ORDER — PRASUGREL 10 MG/1
10 TABLET, FILM COATED ORAL DAILY
Qty: 30 TABLET | Refills: 5 | Status: SHIPPED | OUTPATIENT
Start: 2018-05-09 | End: 2018-11-30 | Stop reason: SDUPTHER

## 2018-05-09 RX ORDER — ALBUTEROL SULFATE 90 UG/1
2 AEROSOL, METERED RESPIRATORY (INHALATION) EVERY 4 HOURS PRN
Qty: 1 INHALER | Refills: 5 | Status: SHIPPED | OUTPATIENT
Start: 2018-05-09 | End: 2019-03-05 | Stop reason: SDUPTHER

## 2018-05-09 RX ORDER — GABAPENTIN 100 MG/1
100 CAPSULE ORAL 2 TIMES DAILY PRN
Qty: 60 CAPSULE | Refills: 2 | Status: SHIPPED | OUTPATIENT
Start: 2018-05-09 | End: 2019-12-20

## 2018-05-09 RX ADMIN — METHYLPREDNISOLONE ACETATE 80 MG: 80 INJECTION, SUSPENSION INTRA-ARTICULAR; INTRALESIONAL; INTRAMUSCULAR; SOFT TISSUE at 11:46

## 2018-05-09 NOTE — PROGRESS NOTES
Chief Complaint   Patient presents with   • Follow-up     med refills; c/o RLE pain       Subjective     History of Present Illness   Zina Hameed is a 58 y.o. female presenting for chronic pain.  Patient is requesting refills.  Pain symptoms have been well controlled with the current regimen.  She does have an acute flare of worsening low back pain with right lower extremity radiating pains over the last 3 days.  She does not recall any event which triggered the pain.  Anus described as aching and persistent but travels down the back of her leg.  Patient also requests refill on medications for asthma, hypertension, and pain.    The following portions of the patient's history were reviewed and updated as appropriate: allergies, current medications, past family history, past medical history, past social history, past surgical history and problem list.    Review of Systems   Constitutional: Negative for chills, fatigue and fever.   HENT: Negative for congestion, sinus pressure and sore throat.    Respiratory: Negative for chest tightness and shortness of breath.    Cardiovascular: Negative for chest pain and palpitations.   Gastrointestinal: Negative for abdominal distention, anal bleeding and constipation.   Endocrine: Negative for cold intolerance and polyphagia.   Genitourinary: Negative for difficulty urinating, dysuria and flank pain.   Musculoskeletal: Positive for back pain and gait problem. Negative for arthralgias and myalgias.   Neurological: Negative for dizziness, tremors, weakness and headaches.   Psychiatric/Behavioral: Negative for agitation and decreased concentration. The patient is not nervous/anxious.        No Known Allergies    Past Medical History:   Diagnosis Date   • Asthma 2/19/2010   • BMI 30.0-30.9,adult    • BMI 31.0-31.9,adult    • Carpal tunnel syndrome 8/6/2016   • COPD, moderate    • Hyperlipidemia 5/3/2011   • Scoliosis        Social History     Social History   • Marital status:       Spouse name: N/A   • Number of children: N/A   • Years of education: N/A     Occupational History   • Not on file.     Social History Main Topics   • Smoking status: Former Smoker   • Smokeless tobacco: Not on file   • Alcohol use No      Comment: unknown   • Drug use: No   • Sexual activity: Defer     Other Topics Concern   • Not on file     Social History Narrative   • No narrative on file        Past Surgical History:   Procedure Laterality Date   • ATHERECTOMY      Cath Atherectomy 1 with stent placement   • ILIAC VEIN ANGIOPLASTY / STENTING      PTA Iliac left       Family History   Problem Relation Age of Onset   • Other Mother      Arteriosclerotic cardiovascular disease, cerebrovascular accident   • Hyperlipidemia Mother    • Hypertension Mother    • Kidney disease Mother    • Other Father      Arteriosclerotic cardiovascular disease         Current Outpatient Prescriptions:   •  albuterol (PROVENTIL HFA;VENTOLIN HFA) 108 (90 Base) MCG/ACT inhaler, Inhale 2 puffs Every 4 (Four) Hours As Needed for Wheezing or Shortness of Air., Disp: 1 inhaler, Rfl: 5  •  amitriptyline (ELAVIL) 50 MG tablet, Take 1 tablet by mouth Every Night., Disp: 30 tablet, Rfl: 5  •  aspirin 81 MG EC tablet, Take 1 tablet by mouth Daily., Disp: 120 tablet, Rfl: 5  •  fluticasone (FLONASE) 50 MCG/ACT nasal spray, 1 spray into each nostril 2 (Two) Times a Day., Disp: 1 bottle, Rfl: 2  •  gabapentin (NEURONTIN) 100 MG capsule, Take 1 capsule by mouth 2 (Two) Times a Day As Needed (pain)., Disp: 60 capsule, Rfl: 2  •  ipratropium-albuterol (DUO-NEB) 0.5-2.5 mg/mL nebulizer, Take 1.5 mL by nebulization Every 6 (Six) Hours As Needed for Wheezing or Shortness of Air., Disp: 360 mL, Rfl: 5  •  meclizine (ANTIVERT) 25 MG tablet, Take 1 tablet by mouth 3 (Three) Times a Day As Needed for dizziness., Disp: 20 tablet, Rfl: 0  •  mometasone-formoterol (DULERA 200) 200-5 MCG/ACT inhaler, Inhale 2 puffs 2 (Two) Times a Day., Disp: 13 g,  "Rfl: 5  •  Omega-3 Fatty Acids (FISH OIL) 1000 MG capsule capsule, Take 1 capsule by mouth 2 (Two) Times a Day With Meals., Disp: 60 each, Rfl: 5  •  ondansetron (ZOFRAN) 4 MG tablet, Take 1 tablet by mouth Every 8 (Eight) Hours As Needed for Nausea or Vomiting., Disp: 20 tablet, Rfl: 1  •  oxyCODONE-acetaminophen (PERCOCET)  MG per tablet, Take 1 tablet by mouth Every 8 (Eight) Hours As Needed for Moderate Pain ., Disp: 90 tablet, Rfl: 0  •  pantoprazole (PROTONIX) 40 MG EC tablet, Take 1 tablet by mouth Daily., Disp: 30 tablet, Rfl: 5  •  prasugrel (EFFIENT) 10 MG tablet, Take 1 tablet by mouth Daily., Disp: 30 tablet, Rfl: 5  •  propranolol (INDERAL) 20 MG tablet, Take 1 tablet by mouth 2 (Two) Times a Day., Disp: 60 tablet, Rfl: 5  •  raNITIdine (ZANTAC) 150 MG tablet, Take 1 tablet by mouth Every Night., Disp: 60 tablet, Rfl: 5  •  simvastatin (ZOCOR) 20 MG tablet, Take 1 tablet by mouth Every Night., Disp: 30 tablet, Rfl: 5  •  spironolactone (ALDACTONE) 50 MG tablet, Take 1 tablet by mouth Daily., Disp: 60 tablet, Rfl: 5  •  tiotropium (SPIRIVA) 18 MCG per inhalation capsule, Place 1 capsule into inhaler and inhale Daily., Disp: 30 capsule, Rfl: 5    Current Facility-Administered Medications:   •  methylPREDNISolone acetate (DEPO-medrol) injection 80 mg, 80 mg, Intramuscular, Once, Velasquez Fox DO    Objective   /76 (BP Location: Left arm, Patient Position: Sitting)   Pulse 93   Temp 97.2 °F (36.2 °C)   Ht 157.5 cm (62\")   Wt 71.7 kg (158 lb)   SpO2 98%   BMI 28.90 kg/m²     Physical Exam   Constitutional: She is oriented to person, place, and time. She appears well-developed and well-nourished.   HENT:   Head: Normocephalic and atraumatic.   Eyes: Conjunctivae are normal.   Pulmonary/Chest: Effort normal.   Musculoskeletal: Normal range of motion.   Neurological: She is alert and oriented to person, place, and time.   Psychiatric: She has a normal mood and affect. Her behavior is " normal.   Nursing note and vitals reviewed.      Assessment/Plan   Zina was seen today for follow-up.    Diagnoses and all orders for this visit:    Acute right-sided low back pain with right-sided sciatica  -     methylPREDNISolone acetate (DEPO-medrol) injection 80 mg; Inject 1 mL into the shoulder, thigh, or buttocks 1 (One) Time.    Degeneration of intervertebral disc of lumbar region  -     gabapentin (NEURONTIN) 100 MG capsule; Take 1 capsule by mouth 2 (Two) Times a Day As Needed (pain).  -     amitriptyline (ELAVIL) 50 MG tablet; Take 1 tablet by mouth Every Night.  -     oxyCODONE-acetaminophen (PERCOCET)  MG per tablet; Take 1 tablet by mouth Every 8 (Eight) Hours As Needed for Moderate Pain .    Simple chronic bronchitis  -     albuterol (PROVENTIL HFA;VENTOLIN HFA) 108 (90 Base) MCG/ACT inhaler; Inhale 2 puffs Every 4 (Four) Hours As Needed for Wheezing or Shortness of Air.  -     mometasone-formoterol (DULERA 200) 200-5 MCG/ACT inhaler; Inhale 2 puffs 2 (Two) Times a Day.  -     tiotropium (SPIRIVA) 18 MCG per inhalation capsule; Place 1 capsule into inhaler and inhale Daily.    Peripheral vascular disease  -     aspirin 81 MG EC tablet; Take 1 tablet by mouth Daily.  -     prasugrel (EFFIENT) 10 MG tablet; Take 1 tablet by mouth Daily.    BPPV (benign paroxysmal positional vertigo), right  -     meclizine (ANTIVERT) 25 MG tablet; Take 1 tablet by mouth 3 (Three) Times a Day As Needed for dizziness.    Dyslipidemia  -     Omega-3 Fatty Acids (FISH OIL) 1000 MG capsule capsule; Take 1 capsule by mouth 2 (Two) Times a Day With Meals.  -     simvastatin (ZOCOR) 20 MG tablet; Take 1 tablet by mouth Every Night.    Viral gastritis  -     ondansetron (ZOFRAN) 4 MG tablet; Take 1 tablet by mouth Every 8 (Eight) Hours As Needed for Nausea or Vomiting.    Gastroesophageal reflux disease without esophagitis  -     pantoprazole (PROTONIX) 40 MG EC tablet; Take 1 tablet by mouth Daily.  -     raNITIdine  (ZANTAC) 150 MG tablet; Take 1 tablet by mouth Every Night.    COPD with acute exacerbation    Essential hypertension  -     propranolol (INDERAL) 20 MG tablet; Take 1 tablet by mouth 2 (Two) Times a Day.  -     spironolactone (ALDACTONE) 50 MG tablet; Take 1 tablet by mouth Daily.          Discussion Summary:    1. Chronic pain   - medications were refilled as patient continues to benefit from the medications.   - UDS performed, prior UDS results reviewed and appropriate  - Patient is aware he/she is being prescribed a high risk controlled substance. A ZION was reviewed and appropriate prior to providing prescriptions for controlled substances.    2.  Acute on chronic back pain  -Steroid injection given today    3.  GERD  -Medications refilled.    4.  Hypertension  -Stable.  Blood pressure slightly high due to pain.  Medications were refilled.    5.  Hyperlipidemia  -Stable  -Meds refilled.    Follow up:  No Follow-up on file.     Patient Instructions:  Patient instructions were provided.

## 2018-05-09 NOTE — PATIENT INSTRUCTIONS
Back Pain, Adult  Back pain is very common. The pain often gets better over time. The cause of back pain is usually not dangerous. Most people can learn to manage their back pain on their own.  Follow these instructions at home:  Watch your back pain for any changes. The following actions may help to lessen any pain you are feeling:  · Stay active. Start with short walks on flat ground if you can. Try to walk farther each day.  · Exercise regularly as told by your doctor. Exercise helps your back heal faster. It also helps avoid future injury by keeping your muscles strong and flexible.  · Do not sit, drive, or  one place for more than 30 minutes.  · Do not stay in bed. Resting more than 1-2 days can slow down your recovery.  · Be careful when you bend or lift an object. Use good form when lifting:  ¨ Bend at your knees.  ¨ Keep the object close to your body.  ¨ Do not twist.  · Sleep on a firm mattress. Lie on your side, and bend your knees. If you lie on your back, put a pillow under your knees.  · Take medicines only as told by your doctor.  · Put ice on the injured area.  ¨ Put ice in a plastic bag.  ¨ Place a towel between your skin and the bag.  ¨ Leave the ice on for 20 minutes, 2-3 times a day for the first 2-3 days. After that, you can switch between ice and heat packs.  · Avoid feeling anxious or stressed. Find good ways to deal with stress, such as exercise.  · Maintain a healthy weight. Extra weight puts stress on your back.  Contact a doctor if:  · You have pain that does not go away with rest or medicine.  · You have worsening pain that goes down into your legs or buttocks.  · You have pain that does not get better in one week.  · You have pain at night.  · You lose weight.  · You have a fever or chills.  Get help right away if:  · You cannot control when you poop (bowel movement) or pee (urinate).  · Your arms or legs feel weak.  · Your arms or legs lose feeling (numbness).  · You feel sick to  your stomach (nauseous) or throw up (vomit).  · You have belly (abdominal) pain.  · You feel like you may pass out (faint).  This information is not intended to replace advice given to you by your health care provider. Make sure you discuss any questions you have with your health care provider.  Document Released: 06/05/2009 Document Revised: 05/25/2017 Document Reviewed: 04/21/2015  [employer] Interactive Patient Education © 2017 [employer]

## 2018-05-10 DIAGNOSIS — I10 ESSENTIAL HYPERTENSION: ICD-10-CM

## 2018-05-10 DIAGNOSIS — K21.9 GASTROESOPHAGEAL REFLUX DISEASE WITHOUT ESOPHAGITIS: ICD-10-CM

## 2018-05-10 DIAGNOSIS — E78.5 DYSLIPIDEMIA: ICD-10-CM

## 2018-05-10 RX ORDER — PROPRANOLOL HYDROCHLORIDE 20 MG/1
20 TABLET ORAL 2 TIMES DAILY
Qty: 60 TABLET | Refills: 5 | Status: SHIPPED | OUTPATIENT
Start: 2018-05-10 | End: 2019-03-05 | Stop reason: SDUPTHER

## 2018-05-10 RX ORDER — RANITIDINE 150 MG/1
150 TABLET ORAL NIGHTLY
Qty: 60 TABLET | Refills: 5 | Status: SHIPPED | OUTPATIENT
Start: 2018-05-10 | End: 2019-03-05 | Stop reason: SDUPTHER

## 2018-05-10 RX ORDER — SIMVASTATIN 20 MG
20 TABLET ORAL NIGHTLY
Qty: 30 TABLET | Refills: 5 | Status: SHIPPED | OUTPATIENT
Start: 2018-05-10 | End: 2019-03-05 | Stop reason: SDUPTHER

## 2018-05-10 RX ORDER — PANTOPRAZOLE SODIUM 40 MG/1
40 TABLET, DELAYED RELEASE ORAL DAILY
Qty: 30 TABLET | Refills: 5 | Status: SHIPPED | OUTPATIENT
Start: 2018-05-10 | End: 2019-03-05 | Stop reason: SDUPTHER

## 2018-05-30 ENCOUNTER — TRANSCRIBE ORDERS (OUTPATIENT)
Dept: ADMINISTRATIVE | Facility: HOSPITAL | Age: 58
End: 2018-05-30

## 2018-05-30 DIAGNOSIS — M54.5 LOW BACK PAIN, UNSPECIFIED BACK PAIN LATERALITY, UNSPECIFIED CHRONICITY, WITH SCIATICA PRESENCE UNSPECIFIED: Primary | ICD-10-CM

## 2018-06-05 ENCOUNTER — APPOINTMENT (OUTPATIENT)
Dept: MRI IMAGING | Facility: HOSPITAL | Age: 58
End: 2018-06-05

## 2018-07-20 ENCOUNTER — HOSPITAL ENCOUNTER (OUTPATIENT)
Dept: MRI IMAGING | Facility: HOSPITAL | Age: 58
Discharge: HOME | End: 2018-07-20
Admitting: FAMILY MEDICINE

## 2018-07-20 DIAGNOSIS — M54.5 LOW BACK PAIN, UNSPECIFIED BACK PAIN LATERALITY, UNSPECIFIED CHRONICITY, WITH SCIATICA PRESENCE UNSPECIFIED: ICD-10-CM

## 2018-07-20 PROCEDURE — 72148 MRI LUMBAR SPINE W/O DYE: CPT

## 2018-08-07 ENCOUNTER — OFFICE VISIT (OUTPATIENT)
Dept: INTERNAL MEDICINE | Facility: CLINIC | Age: 58
End: 2018-08-07

## 2018-08-07 VITALS
WEIGHT: 163 LBS | BODY MASS INDEX: 30 KG/M2 | HEART RATE: 92 BPM | SYSTOLIC BLOOD PRESSURE: 140 MMHG | TEMPERATURE: 97.3 F | HEIGHT: 62 IN | OXYGEN SATURATION: 97 % | DIASTOLIC BLOOD PRESSURE: 80 MMHG

## 2018-08-07 DIAGNOSIS — E78.49 OTHER HYPERLIPIDEMIA: ICD-10-CM

## 2018-08-07 DIAGNOSIS — I10 ESSENTIAL HYPERTENSION: ICD-10-CM

## 2018-08-07 DIAGNOSIS — Z23 NEED FOR SHINGLES VACCINE: Primary | ICD-10-CM

## 2018-08-07 PROCEDURE — 99214 OFFICE O/P EST MOD 30 MIN: CPT | Performed by: INTERNAL MEDICINE

## 2018-08-07 NOTE — PROGRESS NOTES
Chief Complaint   Patient presents with   • Chronic Pain Syndrome     seeing Dr. Morillo and Dr. Boyle - scheduled on 8/8/18 for a pain block        Subjective     History of Present Illness   Zina Hameed is a 58 y.o. female presenting for follow up on medical problems.  Patient shares she had fall on stairs and bruised her left leg but soreness is improving.  Plans on spinal block with pain management for tomorrow.  Has held her effient/ aspirin.  Has been put on muscle relaxers and reduced dose of percocet per pain management. Gabapentin has also been continued.      Breathing remains stable with current inhalers. Remote smoking history.      The following portions of the patient's history were reviewed and updated as appropriate: allergies, current medications, past family history, past medical history, past social history, past surgical history and problem list.    Review of Systems   Constitutional: Negative for chills, fatigue and fever.   HENT: Negative for congestion, ear pain, rhinorrhea, sinus pressure and sore throat.    Eyes: Negative for visual disturbance.   Respiratory: Negative for cough, chest tightness, shortness of breath and wheezing.    Cardiovascular: Negative for chest pain, palpitations and leg swelling.   Gastrointestinal: Negative for abdominal pain, blood in stool, constipation, diarrhea, nausea and vomiting.   Endocrine: Negative for polydipsia and polyuria.   Genitourinary: Negative for dysuria and hematuria.   Musculoskeletal: Negative for back pain.   Skin: Negative for rash.   Neurological: Negative for dizziness, light-headedness, numbness and headaches.   Psychiatric/Behavioral: Negative for dysphoric mood and sleep disturbance. The patient is not nervous/anxious.        No Known Allergies    Past Medical History:   Diagnosis Date   • Asthma 2/19/2010   • BMI 30.0-30.9,adult    • BMI 31.0-31.9,adult    • Carpal tunnel syndrome 8/6/2016   • COPD, moderate (CMS/HCC)    •  Hyperlipidemia 5/3/2011   • Scoliosis        Social History     Social History   • Marital status:      Spouse name: N/A   • Number of children: N/A   • Years of education: N/A     Occupational History   • Not on file.     Social History Main Topics   • Smoking status: Former Smoker   • Smokeless tobacco: Never Used   • Alcohol use No      Comment: unknown   • Drug use: No   • Sexual activity: Defer     Other Topics Concern   • Not on file     Social History Narrative   • No narrative on file        Past Surgical History:   Procedure Laterality Date   • ATHERECTOMY      Cath Atherectomy 1 with stent placement   • ILIAC VEIN ANGIOPLASTY / STENTING      PTA Iliac left       Family History   Problem Relation Age of Onset   • Other Mother         Arteriosclerotic cardiovascular disease, cerebrovascular accident   • Hyperlipidemia Mother    • Hypertension Mother    • Kidney disease Mother    • Other Father         Arteriosclerotic cardiovascular disease         Current Outpatient Prescriptions:   •  albuterol (PROVENTIL HFA;VENTOLIN HFA) 108 (90 Base) MCG/ACT inhaler, Inhale 2 puffs Every 4 (Four) Hours As Needed for Wheezing or Shortness of Air., Disp: 1 inhaler, Rfl: 5  •  amitriptyline (ELAVIL) 50 MG tablet, Take 1 tablet by mouth Every Night., Disp: 30 tablet, Rfl: 5  •  aspirin 81 MG EC tablet, Take 1 tablet by mouth Daily., Disp: 120 tablet, Rfl: 5  •  fluticasone (FLONASE) 50 MCG/ACT nasal spray, 1 spray into each nostril 2 (Two) Times a Day., Disp: 1 bottle, Rfl: 2  •  gabapentin (NEURONTIN) 100 MG capsule, Take 1 capsule by mouth 2 (Two) Times a Day As Needed (pain). (Patient taking differently: Take 100 mg by mouth 2 (Two) Times a Day As Needed (pain). DR. MATHEWS / DR. ANTUNEZ - PAIN CLINIC), Disp: 60 capsule, Rfl: 2  •  ipratropium-albuterol (DUO-NEB) 0.5-2.5 mg/mL nebulizer, Take 1.5 mL by nebulization Every 6 (Six) Hours As Needed for Wheezing or Shortness of Air., Disp: 360 mL, Rfl: 5  •  meclizine  "(ANTIVERT) 25 MG tablet, Take 1 tablet by mouth 3 (Three) Times a Day As Needed for dizziness., Disp: 20 tablet, Rfl: 0  •  mometasone-formoterol (DULERA 200) 200-5 MCG/ACT inhaler, Inhale 2 puffs 2 (Two) Times a Day., Disp: 13 g, Rfl: 5  •  Omega-3 Fatty Acids (FISH OIL) 1000 MG capsule capsule, Take 1 capsule by mouth 2 (Two) Times a Day With Meals., Disp: 60 each, Rfl: 5  •  ondansetron (ZOFRAN) 4 MG tablet, Take 1 tablet by mouth Every 8 (Eight) Hours As Needed for Nausea or Vomiting., Disp: 20 tablet, Rfl: 1  •  oxyCODONE-acetaminophen (PERCOCET)  MG per tablet, Take 1 tablet by mouth Every 8 (Eight) Hours As Needed for Moderate Pain . (Patient taking differently: Take 1 tablet by mouth Every 8 (Eight) Hours As Needed for Moderate Pain . DR. MATHEWS / DR. ANTUNEZ - PAIN CLINIC), Disp: 90 tablet, Rfl: 0  •  pantoprazole (PROTONIX) 40 MG EC tablet, Take 1 tablet by mouth Daily., Disp: 30 tablet, Rfl: 5  •  prasugrel (EFFIENT) 10 MG tablet, Take 1 tablet by mouth Daily., Disp: 30 tablet, Rfl: 5  •  propranolol (INDERAL) 20 MG tablet, Take 1 tablet by mouth 2 (Two) Times a Day., Disp: 60 tablet, Rfl: 5  •  raNITIdine (ZANTAC) 150 MG tablet, Take 1 tablet by mouth Every Night., Disp: 60 tablet, Rfl: 5  •  simvastatin (ZOCOR) 20 MG tablet, Take 1 tablet by mouth Every Night., Disp: 30 tablet, Rfl: 5  •  spironolactone (ALDACTONE) 50 MG tablet, Take 1 tablet by mouth Daily., Disp: 60 tablet, Rfl: 5  •  tiotropium (SPIRIVA) 18 MCG per inhalation capsule, Place 1 capsule into inhaler and inhale Daily., Disp: 30 capsule, Rfl: 5    Objective   /80   Pulse 92   Temp 97.3 °F (36.3 °C)   Ht 157.5 cm (62\")   Wt 73.9 kg (163 lb)   SpO2 97%   BMI 29.81 kg/m²     Physical Exam   Constitutional: She is oriented to person, place, and time. She appears well-developed and well-nourished.   HENT:   Head: Normocephalic and atraumatic.   Eyes: Conjunctivae are normal.   Pulmonary/Chest: Effort normal. "   Musculoskeletal: Normal range of motion.   Neurological: She is alert and oriented to person, place, and time.   Psychiatric: She has a normal mood and affect. Her behavior is normal.   Nursing note and vitals reviewed.      Assessment/Plan   Zina was seen today for chronic pain syndrome.    Diagnoses and all orders for this visit:    Need for shingles vaccine  -     Zoster Vac Recomb Adjuvanted 50 MCG reconstituted suspension; Inject 1 each into the appropriate muscle as directed by prescriber 1 (One) Time for 1 dose.  -     Comprehensive Metabolic Panel  -     CBC & Differential  -     Lipid Panel    Essential hypertension  -     Comprehensive Metabolic Panel  -     CBC & Differential  -     Lipid Panel    Other hyperlipidemia  -     Comprehensive Metabolic Panel  -     CBC & Differential  -     Lipid Panel          Discussion Summary:    57 yo W F presenting for follow up on medical problems.     1. Chronic low back pain with acute flare due to fall - pain management interventions planned tomorrow.     2. HTN - check labs, stable on current medications.    3. HLD - check labs, currently tolerating simvastatin      Follow up:  Return in about 3 months (around 11/7/2018) for Medicare Wellness.     Patient Instructions:  Patient instructions were provided.

## 2018-11-07 ENCOUNTER — OFFICE VISIT (OUTPATIENT)
Dept: INTERNAL MEDICINE | Facility: CLINIC | Age: 58
End: 2018-11-07

## 2018-11-07 VITALS
HEART RATE: 97 BPM | DIASTOLIC BLOOD PRESSURE: 66 MMHG | SYSTOLIC BLOOD PRESSURE: 152 MMHG | WEIGHT: 169 LBS | OXYGEN SATURATION: 99 % | BODY MASS INDEX: 31.1 KG/M2 | HEIGHT: 62 IN | TEMPERATURE: 97.6 F

## 2018-11-07 DIAGNOSIS — E78.49 OTHER HYPERLIPIDEMIA: ICD-10-CM

## 2018-11-07 DIAGNOSIS — J42 CHRONIC BRONCHITIS, UNSPECIFIED CHRONIC BRONCHITIS TYPE (HCC): ICD-10-CM

## 2018-11-07 DIAGNOSIS — M51.36 DEGENERATION OF INTERVERTEBRAL DISC OF LUMBAR REGION: ICD-10-CM

## 2018-11-07 DIAGNOSIS — Z87.891 FORMER SMOKER: ICD-10-CM

## 2018-11-07 DIAGNOSIS — Z23 NEED FOR INFLUENZA VACCINATION: ICD-10-CM

## 2018-11-07 DIAGNOSIS — I10 ESSENTIAL HYPERTENSION: Primary | ICD-10-CM

## 2018-11-07 DIAGNOSIS — K21.9 GASTROESOPHAGEAL REFLUX DISEASE WITHOUT ESOPHAGITIS: ICD-10-CM

## 2018-11-07 PROCEDURE — G0008 ADMIN INFLUENZA VIRUS VAC: HCPCS | Performed by: INTERNAL MEDICINE

## 2018-11-07 PROCEDURE — 90674 CCIIV4 VAC NO PRSV 0.5 ML IM: CPT | Performed by: INTERNAL MEDICINE

## 2018-11-07 PROCEDURE — G0439 PPPS, SUBSEQ VISIT: HCPCS | Performed by: INTERNAL MEDICINE

## 2018-11-07 PROCEDURE — 99214 OFFICE O/P EST MOD 30 MIN: CPT | Performed by: INTERNAL MEDICINE

## 2018-11-07 RX ORDER — HYDROCHLOROTHIAZIDE 12.5 MG/1
12.5 CAPSULE, GELATIN COATED ORAL DAILY
Qty: 30 CAPSULE | Refills: 5 | Status: SHIPPED | OUTPATIENT
Start: 2018-11-07 | End: 2019-03-05 | Stop reason: SDUPTHER

## 2018-11-07 RX ORDER — SPIRONOLACTONE 50 MG/1
50 TABLET, FILM COATED ORAL 2 TIMES DAILY
Qty: 60 TABLET | Refills: 5
Start: 2018-11-07 | End: 2019-03-05 | Stop reason: SDUPTHER

## 2018-11-07 NOTE — PROGRESS NOTES
QUICK REFERENCE INFORMATION:  The ABCs of the Annual Wellness Visit    Subsequent Medicare Wellness Visit    HEALTH RISK ASSESSMENT    1960    Recent Hospitalizations:  No hospitalization(s) within the last year..        Current Medical Providers:  Patient Care Team:  Velasquez Fox DO as PCP - General (Internal Medicine)  Velasquez Fox DO as PCP - Claims Attributed        Smoking Status:  History   Smoking Status   • Former Smoker   Smokeless Tobacco   • Never Used       Alcohol Consumption:  History   Alcohol Use No     Comment: unknown       Depression Screen:   PHQ-2/PHQ-9 Depression Screening 11/7/2018   Little interest or pleasure in doing things 3   Feeling down, depressed, or hopeless 3   Trouble falling or staying asleep, or sleeping too much 1   Feeling tired or having little energy 1   Poor appetite or overeating 3   Feeling bad about yourself - or that you are a failure or have let yourself or your family down 3   Trouble concentrating on things, such as reading the newspaper or watching television -   Moving or speaking so slowly that other people could have noticed. Or the opposite - being so fidgety or restless that you have been moving around a lot more than usual 1   Thoughts that you would be better off dead, or of hurting yourself in some way 2   Total Score 17   If you checked off any problems, how difficult have these problems made it for you to do your work, take care of things at home, or get along with other people? Not difficult at all       Health Habits and Functional and Cognitive Screening:  Functional & Cognitive Status 11/7/2018   Do you have difficulty preparing food and eating? No   Do you have difficulty bathing yourself, getting dressed or grooming yourself? Yes   Do you have difficulty using the toilet? No   Do you have difficulty moving around from place to place? Yes   Do you have trouble with steps or getting out of a bed or a chair? Yes   In the past year have  you fallen or experienced a near fall? Yes   Current Diet Well Balanced Diet   Dental Exam Not up to date   Eye Exam Not up to date   Exercise (times per week) 5 times per week   Current Exercise Activities Include Housecleaning   Do you need help using the phone?  No   Are you deaf or do you have serious difficulty hearing?  No   Do you need help with transportation? No   Do you need help shopping? Yes   Do you need help preparing meals?  No   Do you need help with housework?  No   Do you need help with laundry? No   Do you need help taking your medications? No   Do you need help managing money? No   Do you ever drive or ride in a car without wearing a seat belt? No   Have you felt unusual stress, anger or loneliness in the last month? Yes   Who do you live with? Child   If you need help, do you have trouble finding someone available to you? No   Do you have difficulty concentrating, remembering or making decisions? Yes           Does the patient have evidence of cognitive impairment? No    Aspirin use counseling: Taking ASA appropriately as indicated      Recent Lab Results:  CMP:  Lab Results   Component Value Date     (H) 02/28/2017    BUN 14 02/28/2017    CREATININE 1.10 02/28/2017    EGFRIFNONA 52 (L) 02/28/2017    EGFRIFAFRI 63 02/28/2017    BCR 12.7 02/28/2017     02/28/2017    K 4.8 02/28/2017    CO2 25.0 (L) 02/28/2017    CALCIUM 10.3 (H) 02/28/2017    PROTENTOTREF 8.9 (H) 02/28/2017    ALBUMIN 5.00 02/28/2017    LABGLOBREF 3.9 02/28/2017    LABIL2 1.3 02/28/2017    BILITOT 0.8 02/28/2017    ALKPHOS 81 02/28/2017    AST 79 (H) 02/28/2017    ALT 71 (H) 02/28/2017     Lipid Panel:  Lab Results   Component Value Date    TRIG 195 (H) 02/28/2017    HDL 45 01/30/2015     HbA1c:  Lab Results   Component Value Date    HGBA1C 5.2 01/30/2015       Visual Acuity:  No exam data present    Age-appropriate Screening Schedule:  Refer to the list below for future screening recommendations based on patient's  age, sex and/or medical conditions. Orders for these recommended tests are listed in the plan section. The patient has been provided with a written plan.    Health Maintenance   Topic Date Due   • ZOSTER VACCINE (1 of 2) 04/05/2011   • LIPID PANEL  02/28/2018   • INFLUENZA VACCINE  08/01/2018   • MAMMOGRAM  03/28/2019   • PAP SMEAR  03/28/2020   • COLONOSCOPY  02/01/2022   • TDAP/TD VACCINES (2 - Td) 03/28/2027        Subjective   History of Present Illness    Zina Hameed is a 58 y.o. female who presents for an Subsequent Wellness Visit.  Chronic medical issues including hyperlipidemia, GERD, and chronic pain have been stable on current medications.  Patient continues to follow with pain management.  She has been having some injection procedures which have been helping.  She is feeling depressed recently given her granddaughter was having thoughts of suicide.  She has a dressing despite taking her granddaughter to a [facility] psychiatrist.  As far as blood pressure, they have been running high.  She has noticed that once at the plane, headache, her blood pressure was up to 170s.    The following portions of the patient's history were reviewed and updated as appropriate: allergies, current medications, past family history, past medical history, past social history, past surgical history and problem list.    Outpatient Medications Prior to Visit   Medication Sig Dispense Refill   • albuterol (PROVENTIL HFA;VENTOLIN HFA) 108 (90 Base) MCG/ACT inhaler Inhale 2 puffs Every 4 (Four) Hours As Needed for Wheezing or Shortness of Air. 1 inhaler 5   • amitriptyline (ELAVIL) 50 MG tablet Take 1 tablet by mouth Every Night. 30 tablet 5   • aspirin 81 MG EC tablet Take 1 tablet by mouth Daily. 120 tablet 5   • fluticasone (FLONASE) 50 MCG/ACT nasal spray 1 spray into each nostril 2 (Two) Times a Day. 1 bottle 2   • gabapentin (NEURONTIN) 100 MG capsule Take 1 capsule by mouth 2 (Two) Times a Day As Needed (pain). (Patient  taking differently: Take 100 mg by mouth 2 (Two) Times a Day As Needed (pain). DR. MATHEWS / DR. ANTUNEZ - PAIN CLINIC) 60 capsule 2   • ipratropium-albuterol (DUO-NEB) 0.5-2.5 mg/mL nebulizer Take 1.5 mL by nebulization Every 6 (Six) Hours As Needed for Wheezing or Shortness of Air. 360 mL 5   • meclizine (ANTIVERT) 25 MG tablet Take 1 tablet by mouth 3 (Three) Times a Day As Needed for dizziness. 20 tablet 0   • mometasone-formoterol (DULERA 200) 200-5 MCG/ACT inhaler Inhale 2 puffs 2 (Two) Times a Day. 13 g 5   • Omega-3 Fatty Acids (FISH OIL) 1000 MG capsule capsule Take 1 capsule by mouth 2 (Two) Times a Day With Meals. 60 each 5   • ondansetron (ZOFRAN) 4 MG tablet Take 1 tablet by mouth Every 8 (Eight) Hours As Needed for Nausea or Vomiting. 20 tablet 1   • oxyCODONE-acetaminophen (PERCOCET)  MG per tablet Take 1 tablet by mouth Every 8 (Eight) Hours As Needed for Moderate Pain . (Patient taking differently: Take 1 tablet by mouth Every 8 (Eight) Hours As Needed for Moderate Pain . DR. MATHEWS / DR. ANTUNEZ - PAIN CLINIC) 90 tablet 0   • pantoprazole (PROTONIX) 40 MG EC tablet Take 1 tablet by mouth Daily. 30 tablet 5   • prasugrel (EFFIENT) 10 MG tablet Take 1 tablet by mouth Daily. 30 tablet 5   • propranolol (INDERAL) 20 MG tablet Take 1 tablet by mouth 2 (Two) Times a Day. 60 tablet 5   • raNITIdine (ZANTAC) 150 MG tablet Take 1 tablet by mouth Every Night. 60 tablet 5   • simvastatin (ZOCOR) 20 MG tablet Take 1 tablet by mouth Every Night. 30 tablet 5   • tiotropium (SPIRIVA) 18 MCG per inhalation capsule Place 1 capsule into inhaler and inhale Daily. 30 capsule 5   • spironolactone (ALDACTONE) 50 MG tablet Take 1 tablet by mouth Daily. 60 tablet 5     No facility-administered medications prior to visit.        Patient Active Problem List   Diagnosis   • Peripheral vascular disease (CMS/HCC)   • Plantar fasciitis   • Degeneration of intervertebral disc of lumbar region   • Hypertension   •  Dyslipidemia   • Chronic obstructive pulmonary disease (CMS/HCC)   • Atopic rhinitis   • Gastroesophageal reflux disease   • Chronic constipation   • Carpal tunnel syndrome   • Screening mammogram, encounter for   • Back pain   • Hyperlipidemia   • Asthma   • Former smoker       Advance Care Planning:  has an advance directive - a copy HAS NOT been provided    Identification of Risk Factors:  Risk factors include: weight , cardiovascular risk, inactivity and depression.    Review of Systems   Constitutional: Negative for chills, fatigue and fever.   HENT: Negative for congestion, ear pain, rhinorrhea, sinus pressure and sore throat.    Eyes: Negative for visual disturbance.   Respiratory: Negative for cough, chest tightness, shortness of breath and wheezing.    Cardiovascular: Negative for chest pain, palpitations and leg swelling.   Gastrointestinal: Negative for abdominal pain, blood in stool, constipation, diarrhea, nausea and vomiting.   Endocrine: Negative for polydipsia and polyuria.   Genitourinary: Negative for dysuria and hematuria.   Musculoskeletal: Negative for back pain.   Skin: Negative for rash.   Neurological: Negative for dizziness, light-headedness, numbness and headaches.   Psychiatric/Behavioral: Positive for dysphoric mood. Negative for sleep disturbance. The patient is not nervous/anxious.        Compared to one year ago, the patient feels her physical health is better.  Compared to one year ago, the patient feels her mental health is worse.    Objective     Physical Exam   Constitutional: She is oriented to person, place, and time. She appears well-nourished. No distress.   HENT:   Head: Atraumatic.   Right Ear: External ear normal.   Left Ear: External ear normal.   Eyes: Conjunctivae are normal. Right eye exhibits no discharge. Left eye exhibits no discharge.   Neck: Normal range of motion. Neck supple.   Cardiovascular: Normal rate and regular rhythm.    No murmur heard.  Pulmonary/Chest:  "Effort normal and breath sounds normal. She has no wheezes. She has no rales.   Abdominal: Soft. Bowel sounds are normal. She exhibits no distension. There is no tenderness.   Neurological: She is alert and oriented to person, place, and time.   Skin: No rash noted. She is not diaphoretic.   Psychiatric: She has a normal mood and affect.   Nursing note and vitals reviewed.      Vitals:    11/07/18 1319   BP: 152/66   BP Location: Right arm   Pulse: 97   Temp: 97.6 °F (36.4 °C)   SpO2: 99%   Weight: 76.7 kg (169 lb)   Height: 157.5 cm (62\")       Patient's Body mass index is 30.91 kg/m². BMI is above normal parameters. Recommendations include: educational material and exercise counseling.      Assessment/Plan   Patient Self-Management and Personalized Health Advice  The patient has been provided with information about: diet, exercise, weight management and mental health concerns and preventive services including:   · Advance directive, Exercise counseling provided, Nutrition counseling provided.    Visit Diagnoses:    ICD-10-CM ICD-9-CM   1. Essential hypertension I10 401.9   2. Other hyperlipidemia E78.49 272.4   3. Need for influenza vaccination Z23 V04.81   4. Gastroesophageal reflux disease without esophagitis K21.9 530.81   5. Chronic bronchitis, unspecified chronic bronchitis type (CMS/[facility]) J42 491.9   6. Degeneration of intervertebral disc of lumbar region M51.36 722.52   7. Former smoker Z87.891 V15.82       Orders Placed This Encounter   Procedures   • Flucelvax Quad=>4Years ([id number])     58-year-old white female presenting for follow-up on medical problems.    1.  Essential hypertension  -Uncontrolled.  Start HCTZ with spironolactone.    2.  Hyperlipidemia  -Labs are pending.  Patient advised to obtain labs as soon as possible.    3.  Needs flu shot-given today.    4.  COPD  -Stable on current medications.    5.  Chronic back pain with degenerative disc disease  -Currently following with pain management.  " No changes needed at this time.        Outpatient Encounter Prescriptions as of 11/7/2018   Medication Sig Dispense Refill   • albuterol (PROVENTIL HFA;VENTOLIN HFA) 108 (90 Base) MCG/ACT inhaler Inhale 2 puffs Every 4 (Four) Hours As Needed for Wheezing or Shortness of Air. 1 inhaler 5   • amitriptyline (ELAVIL) 50 MG tablet Take 1 tablet by mouth Every Night. 30 tablet 5   • aspirin 81 MG EC tablet Take 1 tablet by mouth Daily. 120 tablet 5   • fluticasone (FLONASE) 50 MCG/ACT nasal spray 1 spray into each nostril 2 (Two) Times a Day. 1 bottle 2   • gabapentin (NEURONTIN) 100 MG capsule Take 1 capsule by mouth 2 (Two) Times a Day As Needed (pain). (Patient taking differently: Take 100 mg by mouth 2 (Two) Times a Day As Needed (pain). DR. MATHEWS / DR. ANTUNEZ - PAIN CLINIC) 60 capsule 2   • ipratropium-albuterol (DUO-NEB) 0.5-2.5 mg/mL nebulizer Take 1.5 mL by nebulization Every 6 (Six) Hours As Needed for Wheezing or Shortness of Air. 360 mL 5   • meclizine (ANTIVERT) 25 MG tablet Take 1 tablet by mouth 3 (Three) Times a Day As Needed for dizziness. 20 tablet 0   • mometasone-formoterol (DULERA 200) 200-5 MCG/ACT inhaler Inhale 2 puffs 2 (Two) Times a Day. 13 g 5   • Omega-3 Fatty Acids (FISH OIL) 1000 MG capsule capsule Take 1 capsule by mouth 2 (Two) Times a Day With Meals. 60 each 5   • ondansetron (ZOFRAN) 4 MG tablet Take 1 tablet by mouth Every 8 (Eight) Hours As Needed for Nausea or Vomiting. 20 tablet 1   • oxyCODONE-acetaminophen (PERCOCET)  MG per tablet Take 1 tablet by mouth Every 8 (Eight) Hours As Needed for Moderate Pain . (Patient taking differently: Take 1 tablet by mouth Every 8 (Eight) Hours As Needed for Moderate Pain . DR. MATHEWS / DR. ANTUNEZ - PAIN CLINIC) 90 tablet 0   • pantoprazole (PROTONIX) 40 MG EC tablet Take 1 tablet by mouth Daily. 30 tablet 5   • prasugrel (EFFIENT) 10 MG tablet Take 1 tablet by mouth Daily. 30 tablet 5   • propranolol (INDERAL) 20 MG tablet Take 1 tablet by  mouth 2 (Two) Times a Day. 60 tablet 5   • raNITIdine (ZANTAC) 150 MG tablet Take 1 tablet by mouth Every Night. 60 tablet 5   • simvastatin (ZOCOR) 20 MG tablet Take 1 tablet by mouth Every Night. 30 tablet 5   • spironolactone (ALDACTONE) 50 MG tablet Take 1 tablet by mouth 2 (Two) Times a Day. 60 tablet 5   • tiotropium (SPIRIVA) 18 MCG per inhalation capsule Place 1 capsule into inhaler and inhale Daily. 30 capsule 5   • [DISCONTINUED] spironolactone (ALDACTONE) 50 MG tablet Take 1 tablet by mouth Daily. 60 tablet 5   • hydrochlorothiazide (MICROZIDE) 12.5 MG capsule Take 1 capsule by mouth Daily. 30 capsule 5     No facility-administered encounter medications on file as of 11/7/2018.        Reviewed use of high risk medication in the elderly: yes  Reviewed for potential of harmful drug interactions in the elderly: yes    Follow Up:  Return in about 3 months (around 2/7/2019) for Next scheduled follow up.     An After Visit Summary and PPPS with all of these plans were given to the patient.

## 2018-11-07 NOTE — PATIENT INSTRUCTIONS
Medicare Wellness  Personal Prevention Plan of Service     Date of Office Visit:  2018  Encounter Provider:  Velasquez Fox DO  Place of Service:  [facility]  Patient Name: Zina Hameed  :  1960    As part of the Medicare Wellness portion of your visit today, we are providing you with this personalized preventive plan of services (PPPS). This plan is based upon recommendations of the [place] Preventive Services Task Force (USPSTF) and the Advisory Committee on Immunization Practices (ACIP).    This lists the preventive care services that should be considered, and provides dates of when you are due. Items listed as completed are up-to-date and do not require any further intervention.    Health Maintenance   Topic Date Due   • ZOSTER VACCINE (1 of 2) 2011   • LIPID PANEL  2018   • MEDICARE ANNUAL WELLNESS  2018   • INFLUENZA VACCINE  2018   • MAMMOGRAM  2019   • PAP SMEAR  2020   • COLONOSCOPY  2022   • TDAP/TD VACCINES (2 - Td) 2027   • HEPATITIS C SCREENING  Completed       No orders of the defined types were placed in this encounter.      Return in about 4 months (around 3/7/2019) for Next scheduled follow up.

## 2018-11-30 DIAGNOSIS — I73.9 PERIPHERAL VASCULAR DISEASE (HCC): ICD-10-CM

## 2018-11-30 DIAGNOSIS — M51.36 DEGENERATION OF INTERVERTEBRAL DISC OF LUMBAR REGION: ICD-10-CM

## 2018-12-01 RX ORDER — AMITRIPTYLINE HYDROCHLORIDE 50 MG/1
TABLET, FILM COATED ORAL
Qty: 30 TABLET | Refills: 2 | Status: SHIPPED | OUTPATIENT
Start: 2018-12-01 | End: 2019-03-05 | Stop reason: SDUPTHER

## 2018-12-01 RX ORDER — PRASUGREL 10 MG/1
10 TABLET, FILM COATED ORAL DAILY
Qty: 30 TABLET | Refills: 2 | Status: SHIPPED | OUTPATIENT
Start: 2018-12-01 | End: 2019-03-05 | Stop reason: SDUPTHER

## 2018-12-12 ENCOUNTER — HOSPITAL ENCOUNTER (OUTPATIENT)
Dept: GENERAL RADIOLOGY | Facility: HOSPITAL | Age: 58
Discharge: HOME | End: 2018-12-12
Admitting: PHYSICIAN ASSISTANT

## 2018-12-12 ENCOUNTER — OFFICE VISIT (OUTPATIENT)
Dept: INTERNAL MEDICINE | Facility: CLINIC | Age: 58
End: 2018-12-12

## 2018-12-12 VITALS
TEMPERATURE: 97.1 F | HEART RATE: 83 BPM | HEIGHT: 62 IN | SYSTOLIC BLOOD PRESSURE: 142 MMHG | OXYGEN SATURATION: 98 % | WEIGHT: 165 LBS | DIASTOLIC BLOOD PRESSURE: 81 MMHG | BODY MASS INDEX: 30.36 KG/M2

## 2018-12-12 DIAGNOSIS — E78.49 OTHER HYPERLIPIDEMIA: ICD-10-CM

## 2018-12-12 DIAGNOSIS — M51.36 DEGENERATION OF INTERVERTEBRAL DISC OF LUMBAR REGION: ICD-10-CM

## 2018-12-12 DIAGNOSIS — M54.2 NECK PAIN: Primary | ICD-10-CM

## 2018-12-12 DIAGNOSIS — E65 BUFFALO HUMP: ICD-10-CM

## 2018-12-12 DIAGNOSIS — I10 ESSENTIAL HYPERTENSION: ICD-10-CM

## 2018-12-12 PROCEDURE — 72040 X-RAY EXAM NECK SPINE 2-3 VW: CPT

## 2018-12-12 PROCEDURE — 99214 OFFICE O/P EST MOD 30 MIN: CPT | Performed by: PHYSICIAN ASSISTANT

## 2018-12-12 RX ORDER — AMITRIPTYLINE HYDROCHLORIDE 50 MG/1
TABLET, FILM COATED ORAL
Qty: 30 TABLET | Refills: 3 | Status: SHIPPED | OUTPATIENT
Start: 2018-12-12 | End: 2019-03-05 | Stop reason: SDUPTHER

## 2018-12-12 NOTE — PROGRESS NOTES
"Subjective     Chief Complaint: neck pain    History of Present Illness     Zian Hameed is a 58 y.o. female presenting with complaints of neck pain x 2 weeks. Patient states she knows she has arthritis in other areas. She takes percocet BID-TID from pain management. This is somewhat improved with ibuprofen, muscle relaxers. She denies any new injury. Pain does not radiate into arms.    She has also noticed that she is developing a \"hump\" to posterior neck. This is soft, nontender.     The following portions of the patient's history were reviewed and updated as appropriate: current medications, allergies, PMH.    Review of Systems   Constitutional: Negative for appetite change, chills, fatigue, fever and unexpected weight change.   HENT: Negative for congestion, ear pain, hearing loss, nosebleeds, sinus pressure, sore throat, tinnitus and trouble swallowing.    Eyes: Negative for pain, discharge, redness, itching and visual disturbance.   Respiratory: Negative for cough, chest tightness, shortness of breath and wheezing.    Cardiovascular: Negative for chest pain, palpitations and leg swelling.   Gastrointestinal: Negative for abdominal pain, blood in stool, constipation, diarrhea, nausea and vomiting.   Endocrine: Negative for cold intolerance, heat intolerance, polydipsia, polyphagia and polyuria.   Genitourinary: Negative for decreased urine volume, dysuria, flank pain, frequency and hematuria.   Musculoskeletal: Positive for back pain, neck pain and neck stiffness. Negative for arthralgias, gait problem, joint swelling and myalgias.   Skin: Negative for color change and rash.   Allergic/Immunologic: Negative for environmental allergies, food allergies and immunocompromised state.   Neurological: Negative for dizziness, syncope, weakness, light-headedness and headaches.   Hematological: Negative for adenopathy. Does not bruise/bleed easily.   Psychiatric/Behavioral: Negative for dysphoric mood, sleep " "disturbance and suicidal ideas. The patient is not nervous/anxious.      Objective     Vitals:    12/12/18 1023   BP: 142/81   Pulse: 83   Temp: 97.1 °F (36.2 °C)   SpO2: 98%   Weight: 74.8 kg (165 lb)   Height: 157.5 cm (62.01\")     Physical Exam   Constitutional: She is oriented to person, place, and time. She appears well-developed and well-nourished. No distress.   HENT:   Mouth/Throat: Oropharynx is clear and moist.   Eyes: Conjunctivae and EOM are normal. Pupils are equal, round, and reactive to light.   Cardiovascular: Normal rate and regular rhythm.   Pulmonary/Chest: Effort normal and breath sounds normal.   Musculoskeletal:        Cervical back: She exhibits decreased range of motion, tenderness and spasm.   Soft, symmetrical fatty hump to posterior neck, buffalo hump appearance.   Neurological: She is alert and oriented to person, place, and time. No cranial nerve deficit.   Skin: Skin is warm and dry.   Psychiatric: She has a normal mood and affect.       Assessment/Plan     Diagnoses and all orders for this visit:    Neck pain  -     XR Spine Cervical 2 or 3 View    Patient sees pain management. May benefit from PT.    Eagle hump  -     SUSIE+Protein Electro, 24-Hr Urine - Urine, Clean Catch  -     Kappa / Lambda Light Chains, Urine, 24 Hour - Urine, Clean Catch  -     Cortisol - AM    R/o Cushing's. 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    Barbie Corea PA-C  12/12/2018         Please note that portions of this note were completed with a voice recognition program. Efforts were made to edit dictation, but occasionally words are mistranscribed.    "

## 2018-12-12 NOTE — TELEPHONE ENCOUNTER
Patient called stating that she would like to speak with the nurse. Patient states that she is wanting to discuss her prescription for Amitriptyline. She's states that she has been trying to get this prescription since the 1st and when she called the pharmacy today she said that there had been a cancellation on the prescription. Please advise. Confirmed call back as [phone]

## 2019-03-05 ENCOUNTER — OFFICE VISIT (OUTPATIENT)
Dept: INTERNAL MEDICINE | Facility: CLINIC | Age: 59
End: 2019-03-05

## 2019-03-05 VITALS
DIASTOLIC BLOOD PRESSURE: 75 MMHG | TEMPERATURE: 97.5 F | BODY MASS INDEX: 30 KG/M2 | WEIGHT: 163 LBS | HEART RATE: 94 BPM | SYSTOLIC BLOOD PRESSURE: 145 MMHG | OXYGEN SATURATION: 97 % | HEIGHT: 62 IN

## 2019-03-05 DIAGNOSIS — H81.11 BPPV (BENIGN PAROXYSMAL POSITIONAL VERTIGO), RIGHT: ICD-10-CM

## 2019-03-05 DIAGNOSIS — I10 ESSENTIAL HYPERTENSION: ICD-10-CM

## 2019-03-05 DIAGNOSIS — M51.36 DEGENERATION OF INTERVERTEBRAL DISC OF LUMBAR REGION: ICD-10-CM

## 2019-03-05 DIAGNOSIS — I73.9 PERIPHERAL VASCULAR DISEASE (HCC): ICD-10-CM

## 2019-03-05 DIAGNOSIS — K21.9 GASTROESOPHAGEAL REFLUX DISEASE WITHOUT ESOPHAGITIS: ICD-10-CM

## 2019-03-05 DIAGNOSIS — J41.0 SIMPLE CHRONIC BRONCHITIS (HCC): ICD-10-CM

## 2019-03-05 DIAGNOSIS — E78.5 DYSLIPIDEMIA: ICD-10-CM

## 2019-03-05 DIAGNOSIS — K29.70 VIRAL GASTRITIS: ICD-10-CM

## 2019-03-05 PROCEDURE — 99214 OFFICE O/P EST MOD 30 MIN: CPT | Performed by: INTERNAL MEDICINE

## 2019-03-05 RX ORDER — MECLIZINE HYDROCHLORIDE 25 MG/1
25 TABLET ORAL 3 TIMES DAILY PRN
Qty: 20 TABLET | Refills: 0 | Status: SHIPPED | OUTPATIENT
Start: 2019-03-05 | End: 2019-06-19 | Stop reason: SDUPTHER

## 2019-03-05 RX ORDER — ONDANSETRON 4 MG/1
4 TABLET, FILM COATED ORAL EVERY 8 HOURS PRN
Qty: 20 TABLET | Refills: 5 | Status: SHIPPED | OUTPATIENT
Start: 2019-03-05 | End: 2020-06-23 | Stop reason: SDUPTHER

## 2019-03-05 RX ORDER — PANTOPRAZOLE SODIUM 40 MG/1
40 TABLET, DELAYED RELEASE ORAL DAILY
Qty: 30 TABLET | Refills: 5 | Status: SHIPPED | OUTPATIENT
Start: 2019-03-05 | End: 2019-06-19

## 2019-03-05 RX ORDER — PROPRANOLOL HYDROCHLORIDE 20 MG/1
20 TABLET ORAL 2 TIMES DAILY
Qty: 60 TABLET | Refills: 5 | Status: SHIPPED | OUTPATIENT
Start: 2019-03-05 | End: 2020-06-23 | Stop reason: SDUPTHER

## 2019-03-05 RX ORDER — RANITIDINE 150 MG/1
150 TABLET ORAL NIGHTLY
Qty: 60 TABLET | Refills: 5 | Status: SHIPPED | OUTPATIENT
Start: 2019-03-05 | End: 2019-06-19 | Stop reason: SDUPTHER

## 2019-03-05 RX ORDER — AMITRIPTYLINE HYDROCHLORIDE 50 MG/1
50 TABLET, FILM COATED ORAL EVERY EVENING
Qty: 30 TABLET | Refills: 11 | Status: SHIPPED | OUTPATIENT
Start: 2019-03-05 | End: 2019-12-20 | Stop reason: SDUPTHER

## 2019-03-05 RX ORDER — ASPIRIN 81 MG/1
81 TABLET ORAL DAILY
Qty: 120 TABLET | Refills: 3 | Status: SHIPPED | OUTPATIENT
Start: 2019-03-05 | End: 2019-06-19 | Stop reason: SDUPTHER

## 2019-03-05 RX ORDER — HYDROCHLOROTHIAZIDE 12.5 MG/1
12.5 CAPSULE, GELATIN COATED ORAL DAILY
Qty: 30 CAPSULE | Refills: 5 | Status: SHIPPED | OUTPATIENT
Start: 2019-03-05 | End: 2020-01-13 | Stop reason: SDUPTHER

## 2019-03-05 RX ORDER — ALBUTEROL SULFATE 90 UG/1
2 AEROSOL, METERED RESPIRATORY (INHALATION) EVERY 4 HOURS PRN
Qty: 1 INHALER | Refills: 11 | Status: SHIPPED | OUTPATIENT
Start: 2019-03-05 | End: 2019-06-19 | Stop reason: SDUPTHER

## 2019-03-05 RX ORDER — SPIRONOLACTONE 50 MG/1
50 TABLET, FILM COATED ORAL 2 TIMES DAILY
Qty: 60 TABLET | Refills: 5
Start: 2019-03-05 | End: 2019-06-19

## 2019-03-05 RX ORDER — SIMVASTATIN 20 MG
20 TABLET ORAL NIGHTLY
Qty: 30 TABLET | Refills: 5 | Status: SHIPPED | OUTPATIENT
Start: 2019-03-05 | End: 2020-01-13 | Stop reason: SDUPTHER

## 2019-03-05 RX ORDER — IPRATROPIUM BROMIDE AND ALBUTEROL SULFATE 2.5; .5 MG/3ML; MG/3ML
1.5 SOLUTION RESPIRATORY (INHALATION) EVERY 6 HOURS PRN
Qty: 360 ML | Refills: 5 | Status: SHIPPED | OUTPATIENT
Start: 2019-03-05 | End: 2019-12-20

## 2019-03-05 RX ORDER — PRASUGREL 10 MG/1
10 TABLET, FILM COATED ORAL DAILY
Qty: 30 TABLET | Refills: 2 | Status: SHIPPED | OUTPATIENT
Start: 2019-03-05 | End: 2019-06-19 | Stop reason: SDUPTHER

## 2019-03-05 NOTE — PROGRESS NOTES
Chief Complaint   Patient presents with   • Hypertension   • Hyperlipidemia   • COPD       Subjective     History of Present Illness   Zina Hameed is a 58 y.o. female presenting for follow up.  Patient has gradually lost a few pounds over the last several months, she has been trying to increase physical activity.  Has not yet obtained labs.  Had a sip of soda at 7:00am this morning but did not eat anything else otherwise.  COPD is stable with current inhalers.  Refills were requested.  Continues to follow with pain management, plans for low back injections later this month.     Has been weaning down on GERD medications and aims to stay on just zantac over the next month.      The following portions of the patient's history were reviewed and updated as appropriate: allergies, current medications, past family history, past medical history, past social history, past surgical history and problem list.    Review of Systems   Constitutional: Negative for chills, fatigue and fever.   HENT: Negative for congestion, ear pain, rhinorrhea, sinus pressure and sore throat.    Eyes: Negative for visual disturbance.   Respiratory: Negative for cough, chest tightness, shortness of breath and wheezing.    Cardiovascular: Negative for chest pain, palpitations and leg swelling.   Gastrointestinal: Negative for abdominal pain, blood in stool, constipation, diarrhea, nausea and vomiting.   Endocrine: Negative for polydipsia and polyuria.   Genitourinary: Negative for dysuria and hematuria.   Musculoskeletal: Negative for arthralgias and back pain.   Skin: Negative for rash.   Neurological: Negative for dizziness, light-headedness, numbness and headaches.   Psychiatric/Behavioral: Negative for dysphoric mood and sleep disturbance. The patient is not nervous/anxious.        No Known Allergies    Past Medical History:   Diagnosis Date   • Asthma 2/19/2010   • BMI 30.0-30.9,adult    • BMI 31.0-31.9,adult    • Carpal tunnel syndrome  8/6/2016   • COPD, moderate (CMS/HCC)    • Hyperlipidemia 5/3/2011   • Scoliosis        Social History     Socioeconomic History   • Marital status:      Spouse name: Not on file   • Number of children: Not on file   • Years of education: Not on file   • Highest education level: Not on file   Social Needs   • Financial resource strain: Not on file   • Food insecurity - worry: Not on file   • Food insecurity - inability: Not on file   • Transportation needs - medical: Not on file   • Transportation needs - non-medical: Not on file   Occupational History   • Not on file   Tobacco Use   • Smoking status: Former Smoker   • Smokeless tobacco: Never Used   Substance and Sexual Activity   • Alcohol use: No     Comment: unknown   • Drug use: No   • Sexual activity: Defer   Other Topics Concern   • Not on file   Social History Narrative   • Not on file        Past Surgical History:   Procedure Laterality Date   • ATHERECTOMY      Cath Atherectomy 1 with stent placement   • ILIAC VEIN ANGIOPLASTY / STENTING      PTA Iliac left       Family History   Problem Relation Age of Onset   • Other Mother         Arteriosclerotic cardiovascular disease, cerebrovascular accident   • Hyperlipidemia Mother    • Hypertension Mother    • Kidney disease Mother    • Other Father         Arteriosclerotic cardiovascular disease         Current Outpatient Medications:   •  albuterol (PROVENTIL HFA;VENTOLIN HFA) 108 (90 Base) MCG/ACT inhaler, Inhale 2 puffs Every 4 (Four) Hours As Needed for Wheezing or Shortness of Air., Disp: 1 inhaler, Rfl: 5  •  amitriptyline (ELAVIL) 50 MG tablet, take 1 tablet by mouth every evening, Disp: 30 tablet, Rfl: 2  •  aspirin 81 MG EC tablet, Take 1 tablet by mouth Daily., Disp: 120 tablet, Rfl: 5  •  fluticasone (FLONASE) 50 MCG/ACT nasal spray, 1 spray into each nostril 2 (Two) Times a Day., Disp: 1 bottle, Rfl: 2  •  gabapentin (NEURONTIN) 100 MG capsule, Take 1 capsule by mouth 2 (Two) Times a Day As  Needed (pain). (Patient taking differently: Take 100 mg by mouth 2 (Two) Times a Day As Needed (pain). DR. MATHEWS / DR. ANTUNEZ - PAIN CLINIC), Disp: 60 capsule, Rfl: 2  •  hydrochlorothiazide (MICROZIDE) 12.5 MG capsule, Take 1 capsule by mouth Daily., Disp: 30 capsule, Rfl: 5  •  ipratropium-albuterol (DUO-NEB) 0.5-2.5 mg/mL nebulizer, Take 1.5 mL by nebulization Every 6 (Six) Hours As Needed for Wheezing or Shortness of Air., Disp: 360 mL, Rfl: 5  •  meclizine (ANTIVERT) 25 MG tablet, Take 1 tablet by mouth 3 (Three) Times a Day As Needed for dizziness., Disp: 20 tablet, Rfl: 0  •  mometasone-formoterol (DULERA 200) 200-5 MCG/ACT inhaler, Inhale 2 puffs 2 (Two) Times a Day., Disp: 13 g, Rfl: 5  •  Omega-3 Fatty Acids (FISH OIL) 1000 MG capsule capsule, Take 1 capsule by mouth 2 (Two) Times a Day With Meals., Disp: 60 each, Rfl: 5  •  ondansetron (ZOFRAN) 4 MG tablet, Take 1 tablet by mouth Every 8 (Eight) Hours As Needed for Nausea or Vomiting., Disp: 20 tablet, Rfl: 1  •  oxyCODONE-acetaminophen (PERCOCET)  MG per tablet, Take 1 tablet by mouth Every 8 (Eight) Hours As Needed for Moderate Pain . (Patient taking differently: Take 1 tablet by mouth Every 8 (Eight) Hours As Needed for Moderate Pain . DR. MATHEWS / DR. ANTUNEZ - PAIN CLINIC), Disp: 90 tablet, Rfl: 0  •  pantoprazole (PROTONIX) 40 MG EC tablet, Take 1 tablet by mouth Daily., Disp: 30 tablet, Rfl: 5  •  prasugrel (EFFIENT) 10 MG tablet, take 1 tablet by mouth daily, Disp: 30 tablet, Rfl: 2  •  propranolol (INDERAL) 20 MG tablet, Take 1 tablet by mouth 2 (Two) Times a Day., Disp: 60 tablet, Rfl: 5  •  raNITIdine (ZANTAC) 150 MG tablet, Take 1 tablet by mouth Every Night., Disp: 60 tablet, Rfl: 5  •  simvastatin (ZOCOR) 20 MG tablet, Take 1 tablet by mouth Every Night., Disp: 30 tablet, Rfl: 5  •  spironolactone (ALDACTONE) 50 MG tablet, Take 1 tablet by mouth 2 (Two) Times a Day., Disp: 60 tablet, Rfl: 5  •  tiotropium (SPIRIVA) 18 MCG per  "inhalation capsule, Place 1 capsule into inhaler and inhale Daily., Disp: 30 capsule, Rfl: 5    Objective   /75   Pulse 94   Temp 97.5 °F (36.4 °C)   Ht 157.5 cm (62\")   Wt 73.9 kg (163 lb)   SpO2 97%   BMI 29.81 kg/m²     Physical Exam   Constitutional: She is oriented to person, place, and time. She appears well-developed and well-nourished.   HENT:   Head: Normocephalic and atraumatic.   Eyes: Conjunctivae are normal.   Neck: Normal range of motion. Neck supple.   Pulmonary/Chest: Effort normal.   Musculoskeletal: Normal range of motion.   Neurological: She is alert and oriented to person, place, and time.   Skin: No rash noted.   Psychiatric: She has a normal mood and affect. Her behavior is normal.   Nursing note and vitals reviewed.      Assessment/Plan   Zina was seen today for hypertension, hyperlipidemia and copd.    Diagnoses and all orders for this visit:    Simple chronic bronchitis (CMS/HCC)  -     albuterol sulfate  (90 Base) MCG/ACT inhaler; Inhale 2 puffs Every 4 (Four) Hours As Needed for Wheezing or Shortness of Air.  -     ipratropium-albuterol (DUO-NEB) 0.5-2.5 mg/3 ml nebulizer; Take 1.5 mL by nebulization Every 6 (Six) Hours As Needed for Wheezing or Shortness of Air.  -     mometasone-formoterol (DULERA 200) 200-5 MCG/ACT inhaler; Inhale 2 puffs 2 (Two) Times a Day.  -     tiotropium (SPIRIVA) 18 MCG per inhalation capsule; Place 1 capsule into inhaler and inhale Daily.    Degeneration of intervertebral disc of lumbar region  -     amitriptyline (ELAVIL) 50 MG tablet; Take 1 tablet by mouth Every Evening.    Peripheral vascular disease (CMS/HCC)  -     aspirin 81 MG EC tablet; Take 1 tablet by mouth Daily.  -     prasugrel (EFFIENT) 10 MG tablet; Take 1 tablet by mouth Daily.    Essential hypertension  -     hydrochlorothiazide (MICROZIDE) 12.5 MG capsule; Take 1 capsule by mouth Daily.  -     propranolol (INDERAL) 20 MG tablet; Take 1 tablet by mouth 2 (Two) Times a " Day.  -     spironolactone (ALDACTONE) 50 MG tablet; Take 1 tablet by mouth 2 (Two) Times a Day.    BPPV (benign paroxysmal positional vertigo), right  -     meclizine (ANTIVERT) 25 MG tablet; Take 1 tablet by mouth 3 (Three) Times a Day As Needed for dizziness.    Viral gastritis  -     ondansetron (ZOFRAN) 4 MG tablet; Take 1 tablet by mouth Every 8 (Eight) Hours As Needed for Nausea or Vomiting.    Gastroesophageal reflux disease without esophagitis  -     pantoprazole (PROTONIX) 40 MG EC tablet; Take 1 tablet by mouth Daily.  -     raNITIdine (ZANTAC) 150 MG tablet; Take 1 tablet by mouth Every Night.    Dyslipidemia  -     simvastatin (ZOCOR) 20 MG tablet; Take 1 tablet by mouth Every Night.          Discussion Summary:  Patient is a 58 y.o. female presenting for follow up.        Follow up:  No Follow-up on file.

## 2019-06-19 ENCOUNTER — OFFICE VISIT (OUTPATIENT)
Dept: INTERNAL MEDICINE | Facility: CLINIC | Age: 59
End: 2019-06-19

## 2019-06-19 VITALS
HEART RATE: 92 BPM | WEIGHT: 147 LBS | SYSTOLIC BLOOD PRESSURE: 109 MMHG | DIASTOLIC BLOOD PRESSURE: 60 MMHG | TEMPERATURE: 97.6 F | OXYGEN SATURATION: 100 % | HEIGHT: 62 IN | BODY MASS INDEX: 27.05 KG/M2

## 2019-06-19 DIAGNOSIS — E78.49 OTHER HYPERLIPIDEMIA: ICD-10-CM

## 2019-06-19 DIAGNOSIS — I10 ESSENTIAL HYPERTENSION: ICD-10-CM

## 2019-06-19 DIAGNOSIS — J41.0 SIMPLE CHRONIC BRONCHITIS (HCC): ICD-10-CM

## 2019-06-19 DIAGNOSIS — H81.11 BPPV (BENIGN PAROXYSMAL POSITIONAL VERTIGO), RIGHT: ICD-10-CM

## 2019-06-19 DIAGNOSIS — Z12.31 SCREENING MAMMOGRAM, ENCOUNTER FOR: Primary | ICD-10-CM

## 2019-06-19 DIAGNOSIS — K21.9 GASTROESOPHAGEAL REFLUX DISEASE WITHOUT ESOPHAGITIS: ICD-10-CM

## 2019-06-19 DIAGNOSIS — I73.9 PERIPHERAL VASCULAR DISEASE (HCC): ICD-10-CM

## 2019-06-19 PROCEDURE — 99214 OFFICE O/P EST MOD 30 MIN: CPT | Performed by: INTERNAL MEDICINE

## 2019-06-19 RX ORDER — SPIRONOLACTONE 50 MG/1
50 TABLET, FILM COATED ORAL DAILY
Qty: 30 TABLET | Refills: 5
Start: 2019-06-19 | End: 2019-12-20 | Stop reason: SDUPTHER

## 2019-06-19 RX ORDER — MECLIZINE HYDROCHLORIDE 25 MG/1
25 TABLET ORAL 3 TIMES DAILY PRN
Qty: 20 TABLET | Refills: 0 | Status: SHIPPED | OUTPATIENT
Start: 2019-06-19 | End: 2020-06-23 | Stop reason: SDUPTHER

## 2019-06-19 RX ORDER — PRASUGREL 10 MG/1
10 TABLET, FILM COATED ORAL DAILY
Qty: 30 TABLET | Refills: 2 | Status: SHIPPED | OUTPATIENT
Start: 2019-06-19 | End: 2019-10-02 | Stop reason: SDUPTHER

## 2019-06-19 RX ORDER — ALBUTEROL SULFATE 90 UG/1
2 AEROSOL, METERED RESPIRATORY (INHALATION) EVERY 4 HOURS PRN
Qty: 1 INHALER | Refills: 11 | Status: SHIPPED | OUTPATIENT
Start: 2019-06-19 | End: 2020-06-23 | Stop reason: SDUPTHER

## 2019-06-19 RX ORDER — RANITIDINE 150 MG/1
150 TABLET ORAL 2 TIMES DAILY
Qty: 60 TABLET | Refills: 5 | Status: SHIPPED | OUTPATIENT
Start: 2019-06-19 | End: 2020-01-13 | Stop reason: ALTCHOICE

## 2019-06-19 RX ORDER — ASPIRIN 81 MG/1
81 TABLET ORAL DAILY
Qty: 120 TABLET | Refills: 3 | Status: SHIPPED | OUTPATIENT
Start: 2019-06-19 | End: 2020-06-23 | Stop reason: SDUPTHER

## 2019-06-28 DIAGNOSIS — I10 ESSENTIAL HYPERTENSION: ICD-10-CM

## 2019-07-01 RX ORDER — SPIRONOLACTONE 50 MG/1
50 TABLET, FILM COATED ORAL DAILY
Qty: 60 TABLET | Refills: 3 | Status: SHIPPED | OUTPATIENT
Start: 2019-07-01 | End: 2020-06-23 | Stop reason: SDUPTHER

## 2019-08-05 ENCOUNTER — TELEPHONE (OUTPATIENT)
Dept: INTERNAL MEDICINE | Facility: CLINIC | Age: 59
End: 2019-08-05

## 2019-08-05 DIAGNOSIS — M51.36 DEGENERATION OF INTERVERTEBRAL DISC OF LUMBAR REGION: ICD-10-CM

## 2019-08-05 NOTE — TELEPHONE ENCOUNTER
Patient called today stating that she is no longer going to the pain clinic, but would like Dr. Fox to refill her neurontin and muscle relaxers. Please contact patient to update.

## 2019-10-02 DIAGNOSIS — I73.9 PERIPHERAL VASCULAR DISEASE (HCC): ICD-10-CM

## 2019-10-02 RX ORDER — PRASUGREL 10 MG/1
TABLET, FILM COATED ORAL
Qty: 90 TABLET | Refills: 0 | Status: SHIPPED | OUTPATIENT
Start: 2019-10-02 | End: 2020-01-13 | Stop reason: SDUPTHER

## 2019-12-20 ENCOUNTER — OFFICE VISIT (OUTPATIENT)
Dept: INTERNAL MEDICINE | Facility: CLINIC | Age: 59
End: 2019-12-20

## 2019-12-20 VITALS
OXYGEN SATURATION: 100 % | HEART RATE: 83 BPM | TEMPERATURE: 97.7 F | BODY MASS INDEX: 28.52 KG/M2 | HEIGHT: 62 IN | SYSTOLIC BLOOD PRESSURE: 140 MMHG | WEIGHT: 155 LBS | RESPIRATION RATE: 18 BRPM | DIASTOLIC BLOOD PRESSURE: 60 MMHG

## 2019-12-20 DIAGNOSIS — M51.36 DEGENERATION OF INTERVERTEBRAL DISC OF LUMBAR REGION: ICD-10-CM

## 2019-12-20 DIAGNOSIS — J01.90 ACUTE BACTERIAL SINUSITIS: ICD-10-CM

## 2019-12-20 DIAGNOSIS — Z23 NEED FOR INFLUENZA VACCINATION: Primary | ICD-10-CM

## 2019-12-20 DIAGNOSIS — B96.89 ACUTE BACTERIAL SINUSITIS: ICD-10-CM

## 2019-12-20 PROCEDURE — G0008 ADMIN INFLUENZA VIRUS VAC: HCPCS | Performed by: INTERNAL MEDICINE

## 2019-12-20 PROCEDURE — 99214 OFFICE O/P EST MOD 30 MIN: CPT | Performed by: INTERNAL MEDICINE

## 2019-12-20 PROCEDURE — 90686 IIV4 VACC NO PRSV 0.5 ML IM: CPT | Performed by: INTERNAL MEDICINE

## 2019-12-20 RX ORDER — AMITRIPTYLINE HYDROCHLORIDE 100 MG/1
100 TABLET, FILM COATED ORAL NIGHTLY
Qty: 30 TABLET | Refills: 5 | Status: SHIPPED | OUTPATIENT
Start: 2019-12-20 | End: 2020-06-23 | Stop reason: SDUPTHER

## 2019-12-20 RX ORDER — PANTOPRAZOLE SODIUM 40 MG/1
TABLET, DELAYED RELEASE ORAL
COMMUNITY
Start: 2019-10-02 | End: 2020-01-13 | Stop reason: SDUPTHER

## 2019-12-20 RX ORDER — AMOXICILLIN AND CLAVULANATE POTASSIUM 875; 125 MG/1; MG/1
1 TABLET, FILM COATED ORAL EVERY 12 HOURS SCHEDULED
Qty: 14 TABLET | Refills: 0 | Status: SHIPPED | OUTPATIENT
Start: 2019-12-20 | End: 2020-06-23

## 2019-12-20 NOTE — PROGRESS NOTES
Chief Complaint   Patient presents with   • Follow-up     6 months, discuss meds to help sleep   • Sinus Problem     pressure on one side, and ear bothering her       Subjective     History of Present Illness   Zina Hameed is a 59 y.o. female presenting for 3 weeks of sinus congestion with associated right maxillary tenderness.  Patient has tried nasal sprays and saline without benefit.  Symptoms are affecting sleep, frequently goes many nights without sleep.     She has noticed that her amitriptyline does not help with sleep as much as it used to.    The following portions of the patient's history were reviewed and updated as appropriate: allergies, current medications, past family history, past medical history, past social history, past surgical history and problem list.    Review of Systems   Constitutional: Positive for fatigue. Negative for chills and fever.   HENT: Positive for congestion and sinus pressure. Negative for ear pain, rhinorrhea and sore throat.    Eyes: Negative for visual disturbance.   Respiratory: Positive for cough. Negative for chest tightness, shortness of breath and wheezing.    Cardiovascular: Negative for chest pain, palpitations and leg swelling.   Gastrointestinal: Negative for abdominal pain, blood in stool, constipation, diarrhea, nausea and vomiting.   Endocrine: Negative for polydipsia and polyuria.   Genitourinary: Negative for dysuria and hematuria.   Musculoskeletal: Negative for arthralgias and back pain.   Skin: Negative for rash.   Neurological: Negative for dizziness, light-headedness, numbness and headaches.   Psychiatric/Behavioral: Positive for sleep disturbance. Negative for dysphoric mood. The patient is not nervous/anxious.        No Known Allergies    Past Medical History:   Diagnosis Date   • Asthma 2/19/2010   • BMI 30.0-30.9,adult    • BMI 31.0-31.9,adult    • Carpal tunnel syndrome 8/6/2016   • COPD, moderate (CMS/[facility])    • Hyperlipidemia 5/3/2011   •  Scoliosis        Social History     Socioeconomic History   • Marital status:      Spouse name: Not on file   • Number of children: Not on file   • Years of education: Not on file   • Highest education level: Not on file   Tobacco Use   • Smoking status: Former Smoker   • Smokeless tobacco: Never Used   Substance and Sexual Activity   • Alcohol use: No     Comment: unknown   • Drug use: No   • Sexual activity: Defer        Past Surgical History:   Procedure Laterality Date   • ATHERECTOMY      Cath Atherectomy 1 with stent placement   • ILIAC VEIN ANGIOPLASTY / STENTING      PTA Iliac left       Family History   Problem Relation Age of Onset   • Other Mother         Arteriosclerotic cardiovascular disease, cerebrovascular accident   • Hyperlipidemia Mother    • Hypertension Mother    • Kidney disease Mother    • Other Father         Arteriosclerotic cardiovascular disease         Current Outpatient Medications:   •  albuterol sulfate  (90 Base) MCG/ACT inhaler, Inhale 2 puffs Every 4 (Four) Hours As Needed for Wheezing or Shortness of Air., Disp: 1 inhaler, Rfl: 11  •  amitriptyline (ELAVIL) 100 MG tablet, Take 1 tablet by mouth Every Night., Disp: 30 tablet, Rfl: 5  •  aspirin 81 MG EC tablet, Take 1 tablet by mouth Daily., Disp: 120 tablet, Rfl: 3  •  fluticasone (FLONASE) 50 MCG/ACT nasal spray, 1 spray into each nostril 2 (Two) Times a Day., Disp: 1 bottle, Rfl: 2  •  hydrochlorothiazide (MICROZIDE) 12.5 MG capsule, Take 1 capsule by mouth Daily., Disp: 30 capsule, Rfl: 5  •  meclizine (ANTIVERT) 25 MG tablet, Take 1 tablet by mouth 3 (Three) Times a Day As Needed for dizziness., Disp: 20 tablet, Rfl: 0  •  mometasone-formoterol (DULERA 200) 200-5 MCG/ACT inhaler, Inhale 2 puffs 2 (Two) Times a Day., Disp: 13 g, Rfl: 5  •  Omega-3 Fatty Acids (FISH OIL) 1000 MG capsule capsule, Take 1 capsule by mouth 2 (Two) Times a Day With Meals., Disp: 60 each, Rfl: 5  •  ondansetron (ZOFRAN) 4 MG tablet, Take  "1 tablet by mouth Every 8 (Eight) Hours As Needed for Nausea or Vomiting., Disp: 20 tablet, Rfl: 5  •  pantoprazole (PROTONIX) 40 MG EC tablet, , Disp: , Rfl:   •  prasugrel (EFFIENT) 10 MG tablet, take 1 tablet by mouth once daily, Disp: 90 tablet, Rfl: 0  •  propranolol (INDERAL) 20 MG tablet, Take 1 tablet by mouth 2 (Two) Times a Day., Disp: 60 tablet, Rfl: 5  •  raNITIdine (ZANTAC) 150 MG tablet, Take 1 tablet by mouth 2 (Two) Times a Day., Disp: 60 tablet, Rfl: 5  •  simvastatin (ZOCOR) 20 MG tablet, Take 1 tablet by mouth Every Night., Disp: 30 tablet, Rfl: 5  •  spironolactone (ALDACTONE) 50 MG tablet, take 1 tablet by mouth daily, Disp: 60 tablet, Rfl: 3  •  tiotropium (SPIRIVA) 18 MCG per inhalation capsule, Place 1 capsule into inhaler and inhale Daily., Disp: 30 capsule, Rfl: 5  •  amoxicillin-clavulanate (AUGMENTIN) 875-125 MG per tablet, Take 1 tablet by mouth Every 12 (Twelve) Hours., Disp: 14 tablet, Rfl: 0    Objective   /60   Pulse 83   Temp 97.7 °F (36.5 °C)   Resp 18   Ht 157.5 cm (62\")   Wt 70.3 kg (155 lb)   SpO2 100%   BMI 28.35 kg/m²     Physical Exam   Constitutional: She is oriented to person, place, and time. She appears well-developed and well-nourished.   HENT:   Head: Normocephalic and atraumatic.   Narrow red and boggy nares.   Eyes: Conjunctivae are normal.   Neck: Normal range of motion. Neck supple.   Pulmonary/Chest: Effort normal.   Musculoskeletal: Normal range of motion.   Neurological: She is alert and oriented to person, place, and time.   Skin: No rash noted.   Psychiatric: She has a normal mood and affect. Her behavior is normal.   Nursing note and vitals reviewed.      Assessment/Plan   Zina was seen today for follow-up and sinus problem.    Diagnoses and all orders for this visit:    Need for influenza vaccination  -     Fluarix/Fluzone/Afluria/FluLaval ([id number])    Degeneration of intervertebral disc of lumbar region  -     amitriptyline (ELAVIL) 100 MG " tablet; Take 1 tablet by mouth Every Night.    Acute bacterial sinusitis  -     amoxicillin-clavulanate (AUGMENTIN) 875-125 MG per tablet; Take 1 tablet by mouth Every 12 (Twelve) Hours.          Discussion Summary:  Patient is a 59 y.o. female presenting for bacterial sinusitis.    Acute Bacterial Sinusitis  - Start Antibiotics.  - use of nasal decongestants such as flonase were discussed. Usefulness of Sinus rinses and saline sprays were also reviewed.  - Pt may contact the clinic should symptoms not improve in 3-4 days.    Chronic back pain/insomnia  -Elavil dose increased to 100 mg nightly.    Follow up:  Return in about 6 months (around 6/20/2020) for Medicare Wellness.

## 2019-12-20 NOTE — PATIENT INSTRUCTIONS
Sinusitis, Adult  Sinusitis is inflammation of your sinuses. Sinuses are hollow spaces in the bones around your face. Your sinuses are located:  · Around your eyes.  · In the middle of your forehead.  · Behind your nose.  · In your cheekbones.  Mucus normally drains out of your sinuses. When your nasal tissues become inflamed or swollen, mucus can become trapped or blocked. This allows bacteria, viruses, and fungi to grow, which leads to infection. Most infections of the sinuses are caused by a virus.  Sinusitis can develop quickly. It can last for up to 4 weeks (acute) or for more than 12 weeks (chronic). Sinusitis often develops after a cold.  What are the causes?  This condition is caused by anything that creates swelling in the sinuses or stops mucus from draining. This includes:  · Allergies.  · Asthma.  · Infection from bacteria or viruses.  · Deformities or blockages in your nose or sinuses.  · Abnormal growths in the nose (nasal polyps).  · Pollutants, such as chemicals or irritants in the air.  · Infection from fungi (rare).  What increases the risk?  You are more likely to develop this condition if you:  · Have a weak body defense system (immune system).  · Do a lot of swimming or diving.  · Overuse nasal sprays.  · Smoke.  What are the signs or symptoms?  The main symptoms of this condition are pain and a feeling of pressure around the affected sinuses. Other symptoms include:  · Stuffy nose or congestion.  · Thick drainage from your nose.  · Swelling and warmth over the affected sinuses.  · Headache.  · Upper toothache.  · A cough that may get worse at night.  · Extra mucus that collects in the throat or the back of the nose (postnasal drip).  · Decreased sense of smell and taste.  · Fatigue.  · A fever.  · Sore throat.  · Bad breath.  How is this diagnosed?  This condition is diagnosed based on:  · Your symptoms.  · Your medical history.  · A physical exam.  · Tests to find out if your condition is  acute or chronic. This may include:  ? Checking your nose for nasal polyps.  ? Viewing your sinuses using a device that has a light (endoscope).  ? Testing for allergies or bacteria.  ? Imaging tests, such as an MRI or CT scan.  In rare cases, a bone biopsy may be done to rule out more serious types of fungal sinus disease.  How is this treated?  Treatment for sinusitis depends on the cause and whether your condition is chronic or acute.  · If caused by a virus, your symptoms should go away on their own within 10 days. You may be given medicines to relieve symptoms. They include:  ? Medicines that shrink swollen nasal passages (topical intranasal decongestants).  ? Medicines that treat allergies (antihistamines).  ? A spray that eases inflammation of the nostrils (topical intranasal corticosteroids).  ? Rinses that help get rid of thick mucus in your nose (nasal saline washes).  · If caused by bacteria, your health care provider may recommend waiting to see if your symptoms improve. Most bacterial infections will get better without antibiotic medicine. You may be given antibiotics if you have:  ? A severe infection.  ? A weak immune system.  · If caused by narrow nasal passages or nasal polyps, you may need to have surgery.  Follow these instructions at home:  Medicines  · Take, use, or apply over-the-counter and prescription medicines only as told by your health care provider. These may include nasal sprays.  · If you were prescribed an antibiotic medicine, take it as told by your health care provider. Do not stop taking the antibiotic even if you start to feel better.  Hydrate and humidify    · Drink enough fluid to keep your urine pale yellow. Staying hydrated will help to thin your mucus.  · Use a cool mist humidifier to keep the humidity level in your home above 50%.  · Inhale steam for 10-15 minutes, 3-4 times a day, or as told by your health care provider. You can do this in the bathroom while a hot shower is  running.  · Limit your exposure to cool or dry air.  Rest  · Rest as much as possible.  · Sleep with your head raised (elevated).  · Make sure you get enough sleep each night.  General instructions    · Apply a warm, moist washcloth to your face 3-4 times a day or as told by your health care provider. This will help with discomfort.  · Wash your hands often with soap and water to reduce your exposure to germs. If soap and water are not available, use hand .  · Do not smoke. Avoid being around people who are smoking (secondhand smoke).  · Keep all follow-up visits as told by your health care provider. This is important.  Contact a health care provider if:  · You have a fever.  · Your symptoms get worse.  · Your symptoms do not improve within 10 days.  Get help right away if:  · You have a severe headache.  · You have persistent vomiting.  · You have severe pain or swelling around your face or eyes.  · You have vision problems.  · You develop confusion.  · Your neck is stiff.  · You have trouble breathing.  Summary  · Sinusitis is soreness and inflammation of your sinuses. Sinuses are hollow spaces in the bones around your face.  · This condition is caused by nasal tissues that become inflamed or swollen. The swelling traps or blocks the flow of mucus. This allows bacteria, viruses, and fungi to grow, which leads to infection.  · If you were prescribed an antibiotic medicine, take it as told by your health care provider. Do not stop taking the antibiotic even if you start to feel better.  · Keep all follow-up visits as told by your health care provider. This is important.  This information is not intended to replace advice given to you by your health care provider. Make sure you discuss any questions you have with your health care provider.  Document Released: 12/18/2006 Document Revised: 05/20/2019 Document Reviewed: 05/20/2019  [email or web address] Interactive Patient Education © 2019 [employer]

## 2020-01-10 ENCOUNTER — TELEPHONE (OUTPATIENT)
Dept: INTERNAL MEDICINE | Facility: CLINIC | Age: 60
End: 2020-01-10

## 2020-01-10 NOTE — TELEPHONE ENCOUNTER
"Patient was told by [facility] [address] [facility] pharmacy she would need a \"written\" prescription for her prasugrel (EFFIENT) 10 MG tablet please advise   Patient also asked office if they would refill all of her medications but could not give me list of the specific ones to reorder she just said everything on my chart patient refused to give further instructions patient stated the pharmacy gives her a 90 day supply of all her medications so that is what she is requesting please advise and call patient with any questions or concerns [phone]  "

## 2020-01-13 DIAGNOSIS — I73.9 PERIPHERAL VASCULAR DISEASE (HCC): ICD-10-CM

## 2020-01-13 DIAGNOSIS — E78.5 DYSLIPIDEMIA: ICD-10-CM

## 2020-01-13 DIAGNOSIS — I10 ESSENTIAL HYPERTENSION: ICD-10-CM

## 2020-01-13 RX ORDER — HYDROCHLOROTHIAZIDE 12.5 MG/1
12.5 CAPSULE, GELATIN COATED ORAL DAILY
Qty: 90 CAPSULE | Refills: 1 | Status: SHIPPED | OUTPATIENT
Start: 2020-01-13 | End: 2020-06-23 | Stop reason: SDUPTHER

## 2020-01-13 RX ORDER — PANTOPRAZOLE SODIUM 40 MG/1
40 TABLET, DELAYED RELEASE ORAL DAILY
Qty: 90 TABLET | Refills: 1 | Status: SHIPPED | OUTPATIENT
Start: 2020-01-13 | End: 2020-06-23 | Stop reason: SDUPTHER

## 2020-01-13 RX ORDER — PRASUGREL 10 MG/1
10 TABLET, FILM COATED ORAL DAILY
Qty: 90 TABLET | Refills: 0 | Status: SHIPPED | OUTPATIENT
Start: 2020-01-13 | End: 2020-02-03

## 2020-01-13 RX ORDER — SIMVASTATIN 20 MG
20 TABLET ORAL NIGHTLY
Qty: 90 TABLET | Refills: 1 | Status: SHIPPED | OUTPATIENT
Start: 2020-01-13 | End: 2020-02-03

## 2020-02-01 DIAGNOSIS — E78.5 DYSLIPIDEMIA: ICD-10-CM

## 2020-02-01 DIAGNOSIS — I73.9 PERIPHERAL VASCULAR DISEASE (HCC): ICD-10-CM

## 2020-02-03 RX ORDER — SIMVASTATIN 20 MG
20 TABLET ORAL NIGHTLY
Qty: 90 TABLET | Refills: 1 | Status: SHIPPED | OUTPATIENT
Start: 2020-02-03 | End: 2020-06-23 | Stop reason: SDUPTHER

## 2020-02-03 RX ORDER — PRASUGREL 10 MG/1
TABLET, FILM COATED ORAL
Qty: 90 TABLET | Refills: 1 | Status: SHIPPED | OUTPATIENT
Start: 2020-02-03 | End: 2020-06-23 | Stop reason: SDUPTHER

## 2020-06-23 ENCOUNTER — TELEPHONE (OUTPATIENT)
Dept: INTERNAL MEDICINE | Facility: CLINIC | Age: 60
End: 2020-06-23

## 2020-06-23 ENCOUNTER — OFFICE VISIT (OUTPATIENT)
Dept: INTERNAL MEDICINE | Facility: CLINIC | Age: 60
End: 2020-06-23

## 2020-06-23 VITALS
RESPIRATION RATE: 16 BRPM | SYSTOLIC BLOOD PRESSURE: 136 MMHG | BODY MASS INDEX: 30.69 KG/M2 | DIASTOLIC BLOOD PRESSURE: 74 MMHG | TEMPERATURE: 96.9 F | OXYGEN SATURATION: 97 % | HEART RATE: 96 BPM | HEIGHT: 62 IN | WEIGHT: 166.8 LBS

## 2020-06-23 DIAGNOSIS — Z12.31 SCREENING MAMMOGRAM, ENCOUNTER FOR: ICD-10-CM

## 2020-06-23 DIAGNOSIS — J41.0 SIMPLE CHRONIC BRONCHITIS (HCC): ICD-10-CM

## 2020-06-23 DIAGNOSIS — I73.9 PERIPHERAL VASCULAR DISEASE (HCC): ICD-10-CM

## 2020-06-23 DIAGNOSIS — K21.9 GASTROESOPHAGEAL REFLUX DISEASE WITHOUT ESOPHAGITIS: Primary | ICD-10-CM

## 2020-06-23 DIAGNOSIS — M51.36 DEGENERATION OF INTERVERTEBRAL DISC OF LUMBAR REGION: ICD-10-CM

## 2020-06-23 DIAGNOSIS — I10 ESSENTIAL HYPERTENSION: ICD-10-CM

## 2020-06-23 DIAGNOSIS — H81.11 BPPV (BENIGN PAROXYSMAL POSITIONAL VERTIGO), RIGHT: ICD-10-CM

## 2020-06-23 DIAGNOSIS — E78.5 DYSLIPIDEMIA: ICD-10-CM

## 2020-06-23 DIAGNOSIS — K29.70 VIRAL GASTRITIS: ICD-10-CM

## 2020-06-23 PROCEDURE — 99214 OFFICE O/P EST MOD 30 MIN: CPT | Performed by: INTERNAL MEDICINE

## 2020-06-23 RX ORDER — PROPRANOLOL HYDROCHLORIDE 20 MG/1
20 TABLET ORAL 2 TIMES DAILY
Qty: 60 TABLET | Refills: 5 | Status: SHIPPED | OUTPATIENT
Start: 2020-06-23

## 2020-06-23 RX ORDER — AMITRIPTYLINE HYDROCHLORIDE 100 MG/1
100 TABLET, FILM COATED ORAL NIGHTLY
Qty: 30 TABLET | Refills: 5 | Status: SHIPPED | OUTPATIENT
Start: 2020-06-23

## 2020-06-23 RX ORDER — ASPIRIN 81 MG/1
81 TABLET ORAL DAILY
Qty: 120 TABLET | Refills: 3 | Status: SHIPPED | OUTPATIENT
Start: 2020-06-23

## 2020-06-23 RX ORDER — PANTOPRAZOLE SODIUM 40 MG/1
40 TABLET, DELAYED RELEASE ORAL DAILY
Qty: 90 TABLET | Refills: 1 | Status: SHIPPED | OUTPATIENT
Start: 2020-06-23

## 2020-06-23 RX ORDER — ALBUTEROL SULFATE 90 UG/1
2 AEROSOL, METERED RESPIRATORY (INHALATION) EVERY 4 HOURS PRN
Qty: 1 INHALER | Refills: 11 | Status: SHIPPED | OUTPATIENT
Start: 2020-06-23 | End: 2020-08-26 | Stop reason: SDUPTHER

## 2020-06-23 RX ORDER — SIMVASTATIN 20 MG
20 TABLET ORAL NIGHTLY
Qty: 90 TABLET | Refills: 1 | Status: SHIPPED | OUTPATIENT
Start: 2020-06-23 | End: 2021-01-22

## 2020-06-23 RX ORDER — CHLORAL HYDRATE 500 MG
1 CAPSULE ORAL 2 TIMES DAILY WITH MEALS
Qty: 60 EACH | Refills: 5 | Status: SHIPPED | OUTPATIENT
Start: 2020-06-23

## 2020-06-23 RX ORDER — PRASUGREL 10 MG/1
10 TABLET, FILM COATED ORAL DAILY
Qty: 90 TABLET | Refills: 1 | Status: SHIPPED | OUTPATIENT
Start: 2020-06-23

## 2020-06-23 RX ORDER — HYDROCHLOROTHIAZIDE 12.5 MG/1
12.5 CAPSULE, GELATIN COATED ORAL DAILY
Qty: 90 CAPSULE | Refills: 1 | Status: SHIPPED | OUTPATIENT
Start: 2020-06-23 | End: 2021-01-22

## 2020-06-23 RX ORDER — SPIRONOLACTONE 50 MG/1
50 TABLET, FILM COATED ORAL DAILY
Qty: 60 TABLET | Refills: 3 | Status: SHIPPED | OUTPATIENT
Start: 2020-06-23 | End: 2021-01-22

## 2020-06-23 RX ORDER — ONDANSETRON 4 MG/1
4 TABLET, FILM COATED ORAL EVERY 8 HOURS PRN
Qty: 20 TABLET | Refills: 5 | Status: SHIPPED | OUTPATIENT
Start: 2020-06-23

## 2020-06-23 RX ORDER — MECLIZINE HYDROCHLORIDE 25 MG/1
25 TABLET ORAL 3 TIMES DAILY PRN
Qty: 20 TABLET | Refills: 0 | Status: SHIPPED | OUTPATIENT
Start: 2020-06-23

## 2020-06-23 RX ORDER — METHOCARBAMOL 500 MG/1
500 TABLET, FILM COATED ORAL 2 TIMES DAILY PRN
Qty: 60 TABLET | Refills: 5 | Status: SHIPPED | OUTPATIENT
Start: 2020-06-23 | End: 2020-07-16 | Stop reason: ALTCHOICE

## 2020-06-23 NOTE — PATIENT INSTRUCTIONS
Methocarbamol tablets  What is this medicine?  METHOCARBAMOL (meth oh JERMAN ba mole) helps to relieve pain and stiffness in muscles caused by strains, sprains, or other injury to your muscles.  This medicine may be used for other purposes; ask your health care provider or pharmacist if you have questions.  COMMON BRAND NAME(S): Robaxin  What should I tell my health care provider before I take this medicine?  They need to know if you have any of these conditions:  · kidney disease  · seizures  · an unusual or allergic reaction to methocarbamol, other medicines, foods, dyes, or preservatives  · pregnant or trying to get pregnant  · breast-feeding  How should I use this medicine?  Take this medicine by mouth with a full glass of water. Follow the directions on the prescription label. Take your medicine at regular intervals. Do not take your medicine more often than directed.  Talk to your pediatrician regarding the use of this medicine in children. Special care may be needed.  Overdosage: If you think you have taken too much of this medicine contact a poison control center or emergency room at once.  NOTE: This medicine is only for you. Do not share this medicine with others.  What if I miss a dose?  If you miss a dose, take it as soon as you can. If it is almost time for your next dose, take only the next dose. Do not take double or extra doses.  What may interact with this medicine?  Do not take this medication with any of the following medicines:  · narcotic medicines for cough  This medicine may also interact with the following medications:  · alcohol  · antihistamines for allergy, cough and cold  · certain medicines for anxiety or sleep  · certain medicines for depression like amitriptyline, fluoxetine, sertraline  · certain medicines for seizures like phenobarbital, primidone  · cholinesterase inhibitors like neostigmine, ambenonium, and pyridostigmine bromide  · general anesthetics like halothane, isoflurane,  methoxyflurane, propofol  · local anesthetics like lidocaine, pramoxine, tetracaine  · medicines that relax muscles for surgery  · narcotic medicines for pain  · phenothiazines like chlorpromazine, mesoridazine, prochlorperazine, thioridazine  This list may not describe all possible interactions. Give your health care provider a list of all the medicines, herbs, non-prescription drugs, or dietary supplements you use. Also tell them if you smoke, drink alcohol, or use illegal drugs. Some items may interact with your medicine.  What should I watch for while using this medicine?  Tell your doctor or health care professional if your symptoms do not start to get better or if they get worse.  You may get drowsy or dizzy. Do not drive, use machinery, or do anything that needs mental alertness until you know how this medicine affects you. Do not stand or sit up quickly, especially if you are an older patient. This reduces the risk of dizzy or fainting spells. Alcohol may interfere with the effect of this medicine. Avoid alcoholic drinks.  If you are taking another medicine that also causes drowsiness, you may have more side effects. Give your health care provider a list of all medicines you use. Your doctor will tell you how much medicine to take. Do not take more medicine than directed. Call emergency for help if you have problems breathing or unusual sleepiness.  What side effects may I notice from receiving this medicine?  Side effects that you should report to your doctor or health care professional as soon as possible:  · allergic reactions like skin rash, itching or hives, swelling of the face, lips, or tongue  · breathing problems  · confusion  · seizures  · unusually weak or tired  Side effects that usually do not require medical attention (report to your doctor or health care professional if they continue or are bothersome):  · dizziness  · headache  · metallic taste  · tiredness  · upset stomach  This list may not  describe all possible side effects. Call your doctor for medical advice about side effects. You may report side effects to FDA at 0-734-DYX-5889.  Where should I keep my medicine?  Keep out of the reach of children.  Store at room temperature between 20 and 25 degrees C (68 and 77 degrees F). Keep container tightly closed. Throw away any unused medicine after the expiration date.  NOTE: This sheet is a summary. It may not cover all possible information. If you have questions about this medicine, talk to your doctor, pharmacist, or health care provider.  © 2020 [employer] (2016-09-27 13:11:54)

## 2020-06-24 NOTE — PROGRESS NOTES
Chief Complaint   Patient presents with   • Follow-up     Med refills, pt used to get muscle relaxer from pain clinic, no longer goes, can't remember which one asking for        Subjective     History of Present Illness   Zina Hameed is a 60 y.o. female presenting for follow up on chronic medical problems.  Patient shares that overall she is been doing well especially during the COVID pandemic.  She does need refills on several medications.  GERD symptoms are stable.  Blood pressure has been stable.  She has been taking her cholesterol medications regularly.  Breathing has been stable on current inhalers.    She stopped following pain management and wishes to have muscle relaxers called in for control of chronic back pain.    The following portions of the patient's history were reviewed and updated as appropriate: allergies, current medications, past family history, past medical history, past social history, past surgical history and problem list.    Review of Systems   Constitutional: Negative for chills, fatigue and fever.   HENT: Negative for congestion, ear pain, rhinorrhea, sinus pressure and sore throat.    Eyes: Negative for visual disturbance.   Respiratory: Negative for cough, chest tightness, shortness of breath and wheezing.    Cardiovascular: Negative for chest pain, palpitations and leg swelling.   Gastrointestinal: Negative for abdominal pain, blood in stool, constipation, diarrhea, nausea and vomiting.   Endocrine: Negative for polydipsia and polyuria.   Genitourinary: Negative for dysuria and hematuria.   Musculoskeletal: Negative for arthralgias and back pain.   Skin: Negative for rash.   Neurological: Negative for dizziness, light-headedness, numbness and headaches.   Psychiatric/Behavioral: Negative for dysphoric mood and sleep disturbance. The patient is not nervous/anxious.        No Known Allergies    Past Medical History:   Diagnosis Date   • Asthma 2/19/2010   • BMI 30.0-30.9,adult    •  BMI 31.0-31.9,adult    • Carpal tunnel syndrome 8/6/2016   • COPD, moderate (CMS/HCC)    • Hyperlipidemia 5/3/2011   • Scoliosis        Social History     Socioeconomic History   • Marital status:      Spouse name: Not on file   • Number of children: Not on file   • Years of education: Not on file   • Highest education level: Not on file   Tobacco Use   • Smoking status: Former Smoker   • Smokeless tobacco: Never Used   Substance and Sexual Activity   • Alcohol use: No     Comment: unknown   • Drug use: No   • Sexual activity: Defer        Past Surgical History:   Procedure Laterality Date   • ATHERECTOMY      Cath Atherectomy 1 with stent placement   • ILIAC VEIN ANGIOPLASTY / STENTING      PTA Iliac left       Family History   Problem Relation Age of Onset   • Other Mother         Arteriosclerotic cardiovascular disease, cerebrovascular accident   • Hyperlipidemia Mother    • Hypertension Mother    • Kidney disease Mother    • Other Father         Arteriosclerotic cardiovascular disease         Current Outpatient Medications:   •  albuterol sulfate  (90 Base) MCG/ACT inhaler, Inhale 2 puffs Every 4 (Four) Hours As Needed for Wheezing or Shortness of Air., Disp: 1 inhaler, Rfl: 11  •  amitriptyline (ELAVIL) 100 MG tablet, Take 1 tablet by mouth Every Night., Disp: 30 tablet, Rfl: 5  •  aspirin 81 MG EC tablet, Take 1 tablet by mouth Daily., Disp: 120 tablet, Rfl: 3  •  fluticasone (FLONASE) 50 MCG/ACT nasal spray, 1 spray into each nostril 2 (Two) Times a Day., Disp: 1 bottle, Rfl: 2  •  hydroCHLOROthiazide (Microzide) 12.5 MG capsule, Take 1 capsule by mouth Daily., Disp: 90 capsule, Rfl: 1  •  meclizine (ANTIVERT) 25 MG tablet, Take 1 tablet by mouth 3 (Three) Times a Day As Needed for Dizziness., Disp: 20 tablet, Rfl: 0  •  mometasone-formoterol (DULERA 200) 200-5 MCG/ACT inhaler, Inhale 2 puffs 2 (Two) Times a Day., Disp: 13 g, Rfl: 5  •  Omega-3 Fatty Acids (FISH OIL) 1000 MG capsule capsule,  "Take 1 capsule by mouth 2 (Two) Times a Day With Meals., Disp: 60 each, Rfl: 5  •  ondansetron (ZOFRAN) 4 MG tablet, Take 1 tablet by mouth Every 8 (Eight) Hours As Needed for Nausea or Vomiting., Disp: 20 tablet, Rfl: 5  •  pantoprazole (PROTONIX) 40 MG EC tablet, Take 1 tablet by mouth Daily., Disp: 90 tablet, Rfl: 1  •  prasugrel (EFFIENT) 10 MG tablet, Take 1 tablet by mouth Daily., Disp: 90 tablet, Rfl: 1  •  propranolol (INDERAL) 20 MG tablet, Take 1 tablet by mouth 2 (Two) Times a Day., Disp: 60 tablet, Rfl: 5  •  simvastatin (ZOCOR) 20 MG tablet, Take 1 tablet by mouth Every Night., Disp: 90 tablet, Rfl: 1  •  spironolactone (ALDACTONE) 50 MG tablet, Take 1 tablet by mouth Daily., Disp: 60 tablet, Rfl: 3  •  tiotropium (SPIRIVA) 18 MCG per inhalation capsule, Place 1 capsule into inhaler and inhale Daily., Disp: 30 capsule, Rfl: 5  •  methocarbamol (Robaxin) 500 MG tablet, Take 1 tablet by mouth 2 (Two) Times a Day As Needed for Muscle Spasms., Disp: 60 tablet, Rfl: 5    Objective   /74   Pulse 96   Temp 96.9 °F (36.1 °C)   Resp 16   Ht 157.5 cm (62\")   Wt 75.7 kg (166 lb 12.8 oz)   SpO2 97%   BMI 30.51 kg/m²     Physical Exam   Constitutional: She is oriented to person, place, and time. She appears well-developed and well-nourished.   HENT:   Head: Normocephalic and atraumatic.   Eyes: Conjunctivae are normal.   Neck: Normal range of motion. Neck supple.   Pulmonary/Chest: Effort normal.   Musculoskeletal: Normal range of motion.   Neurological: She is alert and oriented to person, place, and time.   Skin: No rash noted.   Psychiatric: She has a normal mood and affect. Her behavior is normal.   Nursing note and vitals reviewed.      Assessment/Plan   Zina was seen today for follow-up.    Diagnoses and all orders for this visit:    Gastroesophageal reflux disease without esophagitis  -     pantoprazole (PROTONIX) 40 MG EC tablet; Take 1 tablet by mouth Daily.    Simple chronic bronchitis " (CMS/[facility])  -     albuterol sulfate  (90 Base) MCG/ACT inhaler; Inhale 2 puffs Every 4 (Four) Hours As Needed for Wheezing or Shortness of Air.  -     tiotropium (SPIRIVA) 18 MCG per inhalation capsule; Place 1 capsule into inhaler and inhale Daily.    Degeneration of intervertebral disc of lumbar region  -     amitriptyline (ELAVIL) 100 MG tablet; Take 1 tablet by mouth Every Night.  -     methocarbamol (Robaxin) 500 MG tablet; Take 1 tablet by mouth 2 (Two) Times a Day As Needed for Muscle Spasms.    Peripheral vascular disease (CMS/[facility])  -     aspirin 81 MG EC tablet; Take 1 tablet by mouth Daily.  -     prasugrel (EFFIENT) 10 MG tablet; Take 1 tablet by mouth Daily.  -     CBC & Differential  -     Comprehensive Metabolic Panel  -     Lipid Panel    BPPV (benign paroxysmal positional vertigo), right  -     meclizine (ANTIVERT) 25 MG tablet; Take 1 tablet by mouth 3 (Three) Times a Day As Needed for Dizziness.    Essential hypertension  -     spironolactone (ALDACTONE) 50 MG tablet; Take 1 tablet by mouth Daily.  -     propranolol (INDERAL) 20 MG tablet; Take 1 tablet by mouth 2 (Two) Times a Day.  -     hydroCHLOROthiazide (Microzide) 12.5 MG capsule; Take 1 capsule by mouth Daily.    Dyslipidemia  -     simvastatin (ZOCOR) 20 MG tablet; Take 1 tablet by mouth Every Night.  -     Omega-3 Fatty Acids (FISH OIL) 1000 MG capsule capsule; Take 1 capsule by mouth 2 (Two) Times a Day With Meals.  -     CBC & Differential  -     Comprehensive Metabolic Panel  -     Lipid Panel    Viral gastritis  -     ondansetron (ZOFRAN) 4 MG tablet; Take 1 tablet by mouth Every 8 (Eight) Hours As Needed for Nausea or Vomiting.    Screening mammogram, encounter for  -     Mammo screening digital tomosynthesis bilateral w CAD; Future          Discussion Summary:  Patient is a 60 y.o. female presenting for follow up.  Chronic medical issues including essential hypertension, vertigo, hyperlipidemia, GERD, and chronic back pain are  relatively stable.  Medication refills were provided for the above conditions.    Patient also needs screening mammogram.  Order was placed today.    Preventive health will need to be further addressed at her next wellness visit.        Follow up:  Return in about 6 weeks (around 8/4/2020) for Next scheduled follow up.

## 2020-07-15 ENCOUNTER — TELEPHONE (OUTPATIENT)
Dept: INTERNAL MEDICINE | Facility: CLINIC | Age: 60
End: 2020-07-15

## 2020-07-16 RX ORDER — TIZANIDINE 4 MG/1
4 TABLET ORAL 2 TIMES DAILY PRN
Qty: 60 TABLET | Refills: 1 | Status: SHIPPED | OUTPATIENT
Start: 2020-07-16

## 2020-08-26 DIAGNOSIS — J41.0 SIMPLE CHRONIC BRONCHITIS (HCC): ICD-10-CM

## 2020-08-26 RX ORDER — ALBUTEROL SULFATE 90 UG/1
2 AEROSOL, METERED RESPIRATORY (INHALATION) EVERY 4 HOURS PRN
Qty: 18 G | Refills: 5 | Status: SHIPPED | OUTPATIENT
Start: 2020-08-26

## 2020-08-26 NOTE — TELEPHONE ENCOUNTER
PATIENT CALLING IN TO REQUEST A REFILL ON    DULERA INHALER   AND   PROAIR INHALER    PATIENT SAYS THESE HAVE NOT BEEN REFILLED FOR A LONG TIME     [id number] [address] AT [place] [address] & [address] - [phone] [facility] [phone]

## 2020-11-09 ENCOUNTER — TELEPHONE (OUTPATIENT)
Dept: INTERNAL MEDICINE | Facility: CLINIC | Age: 60
End: 2020-11-09

## 2020-11-09 DIAGNOSIS — J41.0 SIMPLE CHRONIC BRONCHITIS (HCC): Primary | ICD-10-CM

## 2020-12-02 DIAGNOSIS — J41.0 SIMPLE CHRONIC BRONCHITIS (HCC): ICD-10-CM

## 2021-01-22 DIAGNOSIS — I10 ESSENTIAL HYPERTENSION: ICD-10-CM

## 2021-01-22 DIAGNOSIS — E78.5 DYSLIPIDEMIA: ICD-10-CM

## 2021-01-22 RX ORDER — SIMVASTATIN 20 MG
20 TABLET ORAL NIGHTLY
Qty: 90 TABLET | Refills: 1 | Status: SHIPPED | OUTPATIENT
Start: 2021-01-22

## 2021-01-22 RX ORDER — SPIRONOLACTONE 50 MG/1
TABLET, FILM COATED ORAL
Qty: 90 TABLET | Refills: 1 | Status: SHIPPED | OUTPATIENT
Start: 2021-01-22

## 2021-01-22 RX ORDER — HYDROCHLOROTHIAZIDE 12.5 MG/1
CAPSULE, GELATIN COATED ORAL
Qty: 90 CAPSULE | Refills: 3 | Status: SHIPPED | OUTPATIENT
Start: 2021-01-22

## 2021-07-07 ENCOUNTER — TELEPHONE (OUTPATIENT)
Dept: INTERNAL MEDICINE | Facility: CLINIC | Age: 61
End: 2021-07-07

## 2021-07-07 NOTE — TELEPHONE ENCOUNTER
Patient did not complete mammogram ordered on 06/23/2020. This order is too old to be used. May I cancel the order?